# Patient Record
Sex: MALE | Race: BLACK OR AFRICAN AMERICAN | Employment: STUDENT | URBAN - METROPOLITAN AREA
[De-identification: names, ages, dates, MRNs, and addresses within clinical notes are randomized per-mention and may not be internally consistent; named-entity substitution may affect disease eponyms.]

---

## 2019-02-16 ENCOUNTER — APPOINTMENT (OUTPATIENT)
Dept: GENERAL RADIOLOGY | Age: 19
End: 2019-02-16
Payer: MEDICAID

## 2019-02-16 ENCOUNTER — HOSPITAL ENCOUNTER (EMERGENCY)
Age: 19
Discharge: HOME | End: 2019-02-16
Payer: MEDICAID

## 2019-02-16 VITALS
RESPIRATION RATE: 16 BRPM | TEMPERATURE: 98 F | OXYGEN SATURATION: 99 % | DIASTOLIC BLOOD PRESSURE: 85 MMHG | BODY MASS INDEX: 29.68 KG/M2 | HEIGHT: 71 IN | HEART RATE: 87 BPM | WEIGHT: 212 LBS | SYSTOLIC BLOOD PRESSURE: 134 MMHG

## 2019-02-16 DIAGNOSIS — R07.9 CHEST PAIN, UNSPECIFIED TYPE: Primary | ICD-10-CM

## 2019-02-16 DIAGNOSIS — R74.01 TRANSAMINITIS: ICD-10-CM

## 2019-02-16 LAB
A/G RATIO: 1.6 (ref 1.1–2.2)
ALBUMIN SERPL-MCNC: 4.7 G/DL (ref 3.4–5)
ALP BLD-CCNC: 111 U/L (ref 40–129)
ALT SERPL-CCNC: 186 U/L (ref 10–40)
ANION GAP SERPL CALCULATED.3IONS-SCNC: 13 MMOL/L (ref 3–16)
APTT: 34.1 SEC (ref 26–36)
AST SERPL-CCNC: 86 U/L (ref 15–37)
BASOPHILS ABSOLUTE: 0.1 K/UL (ref 0–0.2)
BASOPHILS RELATIVE PERCENT: 1 %
BILIRUB SERPL-MCNC: 0.3 MG/DL (ref 0–1)
BUN BLDV-MCNC: 10 MG/DL (ref 7–20)
CALCIUM SERPL-MCNC: 10.1 MG/DL (ref 8.3–10.6)
CHLORIDE BLD-SCNC: 100 MMOL/L (ref 99–110)
CO2: 24 MMOL/L (ref 21–32)
CREAT SERPL-MCNC: 0.6 MG/DL (ref 0.9–1.3)
D DIMER: <200 NG/ML DDU (ref 0–229)
EOSINOPHILS ABSOLUTE: 0.1 K/UL (ref 0–0.6)
EOSINOPHILS RELATIVE PERCENT: 1.8 %
GFR AFRICAN AMERICAN: >60
GFR NON-AFRICAN AMERICAN: >60
GLOBULIN: 2.9 G/DL
GLUCOSE BLD-MCNC: 100 MG/DL (ref 70–99)
HCT VFR BLD CALC: 43.6 % (ref 40.5–52.5)
HEMOGLOBIN: 14.3 G/DL (ref 13.5–17.5)
INR BLD: 0.97 (ref 0.86–1.14)
LYMPHOCYTES ABSOLUTE: 2.9 K/UL (ref 1–5.1)
LYMPHOCYTES RELATIVE PERCENT: 44.7 %
MCH RBC QN AUTO: 30 PG (ref 26–34)
MCHC RBC AUTO-ENTMCNC: 32.8 G/DL (ref 31–36)
MCV RBC AUTO: 91.4 FL (ref 80–100)
MONOCYTES ABSOLUTE: 0.6 K/UL (ref 0–1.3)
MONOCYTES RELATIVE PERCENT: 10 %
NEUTROPHILS ABSOLUTE: 2.7 K/UL (ref 1.7–7.7)
NEUTROPHILS RELATIVE PERCENT: 42.5 %
PDW BLD-RTO: 13.6 % (ref 12.4–15.4)
PLATELET # BLD: 287 K/UL (ref 135–450)
PMV BLD AUTO: 7.9 FL (ref 5–10.5)
POTASSIUM SERPL-SCNC: 4.2 MMOL/L (ref 3.5–5.1)
PROTHROMBIN TIME: 11.1 SEC (ref 9.8–13)
RBC # BLD: 4.77 M/UL (ref 4.2–5.9)
SODIUM BLD-SCNC: 137 MMOL/L (ref 136–145)
TOTAL PROTEIN: 7.6 G/DL (ref 6.4–8.2)
TROPONIN: <0.01 NG/ML
WBC # BLD: 6.4 K/UL (ref 4–11)

## 2019-02-16 PROCEDURE — 85379 FIBRIN DEGRADATION QUANT: CPT

## 2019-02-16 PROCEDURE — 85730 THROMBOPLASTIN TIME PARTIAL: CPT

## 2019-02-16 PROCEDURE — 85025 COMPLETE CBC W/AUTO DIFF WBC: CPT

## 2019-02-16 PROCEDURE — 85610 PROTHROMBIN TIME: CPT

## 2019-02-16 PROCEDURE — 84484 ASSAY OF TROPONIN QUANT: CPT

## 2019-02-16 PROCEDURE — 71046 X-RAY EXAM CHEST 2 VIEWS: CPT

## 2019-02-16 PROCEDURE — 99285 EMERGENCY DEPT VISIT HI MDM: CPT

## 2019-02-16 PROCEDURE — 93005 ELECTROCARDIOGRAM TRACING: CPT | Performed by: EMERGENCY MEDICINE

## 2019-02-16 PROCEDURE — 80053 COMPREHEN METABOLIC PANEL: CPT

## 2019-02-16 RX ORDER — NAPROXEN 250 MG/1
500 TABLET ORAL ONCE
Status: DISCONTINUED | OUTPATIENT
Start: 2019-02-16 | End: 2019-02-16 | Stop reason: HOSPADM

## 2019-02-16 RX ORDER — NAPROXEN 500 MG/1
500 TABLET ORAL 2 TIMES DAILY PRN
Qty: 20 TABLET | Refills: 0 | Status: SHIPPED | OUTPATIENT
Start: 2019-02-16 | End: 2020-03-02

## 2019-02-16 RX ORDER — KETOROLAC TROMETHAMINE 30 MG/ML
30 INJECTION, SOLUTION INTRAMUSCULAR; INTRAVENOUS ONCE
Status: DISCONTINUED | OUTPATIENT
Start: 2019-02-16 | End: 2019-02-16

## 2019-02-16 ASSESSMENT — ENCOUNTER SYMPTOMS
CHEST TIGHTNESS: 0
VOICE CHANGE: 1
ABDOMINAL PAIN: 0
BACK PAIN: 0
NAUSEA: 1
WHEEZING: 0
DIARRHEA: 0
VOMITING: 0
COUGH: 0
SHORTNESS OF BREATH: 1

## 2019-02-16 ASSESSMENT — HEART SCORE: ECG: 0

## 2019-02-16 ASSESSMENT — PAIN SCALES - GENERAL: PAINLEVEL_OUTOF10: 7

## 2019-02-16 ASSESSMENT — PAIN DESCRIPTION - LOCATION: LOCATION: CHEST

## 2019-02-17 LAB
EKG ATRIAL RATE: 99 BPM
EKG DIAGNOSIS: NORMAL
EKG P AXIS: 70 DEGREES
EKG P-R INTERVAL: 156 MS
EKG Q-T INTERVAL: 326 MS
EKG QRS DURATION: 88 MS
EKG QTC CALCULATION (BAZETT): 418 MS
EKG R AXIS: 47 DEGREES
EKG T AXIS: 29 DEGREES
EKG VENTRICULAR RATE: 99 BPM

## 2019-02-17 PROCEDURE — 93010 ELECTROCARDIOGRAM REPORT: CPT | Performed by: INTERNAL MEDICINE

## 2020-02-20 NOTE — PROGRESS NOTES
[facility]   22 y.o. male   2000    HPI:  Chief Complaint   Patient presents with   Ernesto Birch Doctor     This is patient's first visit with me. He has no chronic health issues. He is majoring in [employer] and attends [employer] and [employer]. He is an internship. He reports of paresthesia of hands and feet for past 2-3 weeks, which is relieved with taking \"vitamins\" - garlic, spiriluna, magnesium. He is not taking any antacids, H2/PPIs, or other meds for reflux. He also reports of dyspepsia and LUQ discomfort. He also reports of skin issues such as dry skin and acne. He had mild \"common cold symptoms\" prior to this. No nausea, emesis, diarrhea, etc.  He denied cervical and lumbar radiculopathy. He denied injury/trauma with neck or low back pain. He denied restless legs. He also reports of \"gaining a lot of weight\" - over past 2 years - 35-40 lbs. He is not physically active. Has been \"feeling more cold lately\". He stated that he had changed his diet about 3 weeks ago cutting out sugar because of the weight gain. Lastly, he reports of darkening of the skin in spots of his upper extremities and forehead and axillary areas. No Known Allergies  Current Outpatient Medications on File Prior to Visit   Medication Sig Dispense Refill    naproxen (NAPROSYN) 500 MG tablet Take 1 tablet by mouth 2 times daily as needed for Pain 20 tablet 0     No current facility-administered medications on file prior to visit. Family History   Problem Relation Age of Onset    High Cholesterol Mother     Cancer Maternal Grandmother      Social History     Tobacco Use    Smoking status: Never Smoker    Smokeless tobacco: Never Used   Substance Use Topics    Alcohol use: Never     Frequency: Never      Review of Systems   Constitutional: Negative for activity change, appetite change, fatigue, fever and unexpected weight change.    HENT: Negative for congestion, rhinorrhea, sinus pressure and rhonchi or rales. Abdominal:      General: Abdomen is flat. There is no distension. There are no signs of injury. Palpations: Abdomen is soft. Tenderness: There is no abdominal tenderness. Hernia: No hernia is present. Musculoskeletal: Normal range of motion. General: No deformity. Right lower leg: No edema. Left lower leg: No edema. Lymphadenopathy:      Cervical: No cervical adenopathy. Skin:     Coloration: Skin is not jaundiced or pale. Findings: No erythema or rash. Neurological:      General: No focal deficit present. Mental Status: He is alert. Mental status is at baseline. Psychiatric:         Mood and Affect: Mood normal.         Behavior: Behavior normal.       Assessment/Plan:     Miller Tafoya was seen today for established new doctor. Diagnoses and all orders for this visit:    Encounter to establish care with new doctor  -     CBC Auto Differential; Future    Paresthesia of both lower extremities  Comments:  Low suspicion for autoimmune conditions or infectious etiologies (eg campylobacter). Orders:  -     Vitamin B12 & Folate; Future  -     T4, Free; Future  -     TSH without Reflex; Future  -     Vitamin D 25 Hydroxy; Future  -     Magnesium; Future  -     PHOSPHORUS; Future  -     Hemoglobin A1C; Future    Paresthesia and pain of both upper extremities  -     Vitamin B12 & Folate; Future  -     T4, Free; Future  -     TSH without Reflex; Future  -     Vitamin D 25 Hydroxy; Future  -     Magnesium; Future  -     PHOSPHORUS; Future  -     Hemoglobin A1C; Future    Left upper quadrant pain  Comments:  No acute abdomen. No organomegaly. Low suspicion of GERD. Could be related to the vitamins he is consuming. Thyromegaly  -     T4, Free; Future  -     TSH without Reflex; Future    Elevated blood-pressure reading, without diagnosis of hypertension  Comments:  Likely due to first visit here. Will re-check at next visit.     Encounter for screening for HIV  -     HIV-1 AND HIV-2 ANTIBODIES; Future       There are no discontinued medications. PDMP Monitoring:  Last PDMP Luna Scale as Reviewed [facility]):  Review User Review Instant Review Result   FAUSTO MOMIN 2/21/2020  3:11 PM Reviewed PDMP [1]       Follow-up: Return in about 4 weeks (around 3/20/2020) for paresthesia issues. . Patient was informed that if his or her symptoms worsen to return here or go to nearest ER if symptoms significantly worsen.      Electronically signed by Abhijit Velarde MD on 2/21/2020 at 4:33 PM.

## 2020-02-21 ENCOUNTER — OFFICE VISIT (OUTPATIENT)
Dept: FAMILY MEDICINE CLINIC | Age: 20
End: 2020-02-21
Payer: MEDICAID

## 2020-02-21 VITALS
TEMPERATURE: 98.2 F | BODY MASS INDEX: 28.14 KG/M2 | OXYGEN SATURATION: 98 % | WEIGHT: 201 LBS | SYSTOLIC BLOOD PRESSURE: 142 MMHG | RESPIRATION RATE: 16 BRPM | DIASTOLIC BLOOD PRESSURE: 80 MMHG | HEART RATE: 82 BPM | HEIGHT: 71 IN

## 2020-02-21 DIAGNOSIS — R20.2 PARESTHESIA AND PAIN OF BOTH UPPER EXTREMITIES: ICD-10-CM

## 2020-02-21 DIAGNOSIS — Z11.4 ENCOUNTER FOR SCREENING FOR HIV: ICD-10-CM

## 2020-02-21 DIAGNOSIS — Z76.89 ENCOUNTER TO ESTABLISH CARE WITH NEW DOCTOR: ICD-10-CM

## 2020-02-21 DIAGNOSIS — R20.2 PARESTHESIA OF BOTH LOWER EXTREMITIES: ICD-10-CM

## 2020-02-21 DIAGNOSIS — M79.601 PARESTHESIA AND PAIN OF BOTH UPPER EXTREMITIES: ICD-10-CM

## 2020-02-21 DIAGNOSIS — M79.602 PARESTHESIA AND PAIN OF BOTH UPPER EXTREMITIES: ICD-10-CM

## 2020-02-21 LAB
BASOPHILS ABSOLUTE: 0 K/UL (ref 0–0.2)
BASOPHILS RELATIVE PERCENT: 0.7 %
EOSINOPHILS ABSOLUTE: 0 K/UL (ref 0–0.6)
EOSINOPHILS RELATIVE PERCENT: 0.5 %
FOLATE: 14.51 NG/ML (ref 4.78–24.2)
HCT VFR BLD CALC: 43 % (ref 40.5–52.5)
HEMOGLOBIN: 14.1 G/DL (ref 13.5–17.5)
LYMPHOCYTES ABSOLUTE: 2.1 K/UL (ref 1–5.1)
LYMPHOCYTES RELATIVE PERCENT: 32.9 %
MAGNESIUM: 2 MG/DL (ref 1.8–2.4)
MCH RBC QN AUTO: 29.8 PG (ref 26–34)
MCHC RBC AUTO-ENTMCNC: 32.8 G/DL (ref 31–36)
MCV RBC AUTO: 90.8 FL (ref 80–100)
MONOCYTES ABSOLUTE: 0.4 K/UL (ref 0–1.3)
MONOCYTES RELATIVE PERCENT: 6 %
NEUTROPHILS ABSOLUTE: 3.9 K/UL (ref 1.7–7.7)
NEUTROPHILS RELATIVE PERCENT: 59.9 %
PDW BLD-RTO: 13.3 % (ref 12.4–15.4)
PHOSPHORUS: 3.5 MG/DL (ref 2.5–4.9)
PLATELET # BLD: 300 K/UL (ref 135–450)
PMV BLD AUTO: 8.7 FL (ref 5–10.5)
RBC # BLD: 4.74 M/UL (ref 4.2–5.9)
T4 FREE: 1.2 NG/DL (ref 0.9–1.8)
TSH SERPL DL<=0.05 MIU/L-ACNC: 1.43 UIU/ML (ref 0.27–4.2)
VITAMIN B-12: 470 PG/ML (ref 211–911)
VITAMIN D 25-HYDROXY: 21.8 NG/ML
WBC # BLD: 6.4 K/UL (ref 4–11)

## 2020-02-21 PROCEDURE — 99204 OFFICE O/P NEW MOD 45 MIN: CPT | Performed by: FAMILY MEDICINE

## 2020-02-21 SDOH — HEALTH STABILITY: MENTAL HEALTH: HOW OFTEN DO YOU HAVE A DRINK CONTAINING ALCOHOL?: NEVER

## 2020-02-21 ASSESSMENT — ENCOUNTER SYMPTOMS
BLOOD IN STOOL: 0
BACK PAIN: 0
SHORTNESS OF BREATH: 0
CONSTIPATION: 0
WHEEZING: 0
ABDOMINAL DISTENTION: 0
NAUSEA: 0
CHEST TIGHTNESS: 0
SINUS PRESSURE: 0
VOMITING: 0
COUGH: 0
RHINORRHEA: 0
DIARRHEA: 0
ABDOMINAL PAIN: 0
TROUBLE SWALLOWING: 0

## 2020-02-22 PROBLEM — E55.9 VITAMIN D DEFICIENCY: Status: ACTIVE | Noted: 2020-02-22

## 2020-02-22 LAB
ESTIMATED AVERAGE GLUCOSE: 91.1 MG/DL
HBA1C MFR BLD: 4.8 %
HIV AG/AB: NORMAL
HIV ANTIGEN: NORMAL
HIV-1 ANTIBODY: NORMAL
HIV-2 AB: NORMAL

## 2020-02-25 ENCOUNTER — TELEPHONE (OUTPATIENT)
Dept: FAMILY MEDICINE CLINIC | Age: 20
End: 2020-02-25

## 2020-02-28 ENCOUNTER — TELEPHONE (OUTPATIENT)
Dept: FAMILY MEDICINE CLINIC | Age: 20
End: 2020-02-28

## 2020-02-28 ENCOUNTER — APPOINTMENT (OUTPATIENT)
Dept: GENERAL RADIOLOGY | Age: 20
End: 2020-02-28
Payer: MEDICAID

## 2020-02-28 ENCOUNTER — HOSPITAL ENCOUNTER (EMERGENCY)
Age: 20
Discharge: HOME | End: 2020-02-29
Attending: EMERGENCY MEDICINE
Payer: MEDICAID

## 2020-02-28 PROBLEM — R74.8 ELEVATED LIVER ENZYMES: Status: ACTIVE | Noted: 2020-02-28

## 2020-02-28 LAB
ANION GAP SERPL CALCULATED.3IONS-SCNC: 14 MMOL/L (ref 3–16)
BASOPHILS ABSOLUTE: 0 K/UL (ref 0–0.2)
BASOPHILS RELATIVE PERCENT: 0.4 %
BUN BLDV-MCNC: 10 MG/DL (ref 7–20)
CALCIUM SERPL-MCNC: 10.1 MG/DL (ref 8.3–10.6)
CHLORIDE BLD-SCNC: 101 MMOL/L (ref 99–110)
CO2: 25 MMOL/L (ref 21–32)
CREAT SERPL-MCNC: 0.8 MG/DL (ref 0.9–1.3)
EOSINOPHILS ABSOLUTE: 0.1 K/UL (ref 0–0.6)
EOSINOPHILS RELATIVE PERCENT: 0.8 %
GFR AFRICAN AMERICAN: >60
GFR NON-AFRICAN AMERICAN: >60
GLUCOSE BLD-MCNC: 99 MG/DL (ref 70–99)
HCT VFR BLD CALC: 44.8 % (ref 40.5–52.5)
HEMOGLOBIN: 15 G/DL (ref 13.5–17.5)
LYMPHOCYTES ABSOLUTE: 2.3 K/UL (ref 1–5.1)
LYMPHOCYTES RELATIVE PERCENT: 33 %
MCH RBC QN AUTO: 30.3 PG (ref 26–34)
MCHC RBC AUTO-ENTMCNC: 33.5 G/DL (ref 31–36)
MCV RBC AUTO: 90.2 FL (ref 80–100)
MONOCYTES ABSOLUTE: 0.4 K/UL (ref 0–1.3)
MONOCYTES RELATIVE PERCENT: 6 %
NEUTROPHILS ABSOLUTE: 4.2 K/UL (ref 1.7–7.7)
NEUTROPHILS RELATIVE PERCENT: 59.8 %
PDW BLD-RTO: 13.2 % (ref 12.4–15.4)
PLATELET # BLD: 322 K/UL (ref 135–450)
PMV BLD AUTO: 8.6 FL (ref 5–10.5)
POTASSIUM SERPL-SCNC: 4.2 MMOL/L (ref 3.5–5.1)
RBC # BLD: 4.96 M/UL (ref 4.2–5.9)
SODIUM BLD-SCNC: 140 MMOL/L (ref 136–145)
TROPONIN: <0.01 NG/ML
WBC # BLD: 7.1 K/UL (ref 4–11)

## 2020-02-28 PROCEDURE — 83690 ASSAY OF LIPASE: CPT

## 2020-02-28 PROCEDURE — 80076 HEPATIC FUNCTION PANEL: CPT

## 2020-02-28 PROCEDURE — 85025 COMPLETE CBC W/AUTO DIFF WBC: CPT

## 2020-02-28 PROCEDURE — 80048 BASIC METABOLIC PNL TOTAL CA: CPT

## 2020-02-28 PROCEDURE — 84484 ASSAY OF TROPONIN QUANT: CPT

## 2020-02-28 PROCEDURE — 71046 X-RAY EXAM CHEST 2 VIEWS: CPT

## 2020-02-28 PROCEDURE — 99285 EMERGENCY DEPT VISIT HI MDM: CPT

## 2020-02-28 ASSESSMENT — PAIN SCALES - GENERAL: PAINLEVEL_OUTOF10: 8

## 2020-02-29 ENCOUNTER — APPOINTMENT (OUTPATIENT)
Dept: CT IMAGING | Age: 20
End: 2020-02-29
Payer: MEDICAID

## 2020-02-29 VITALS
OXYGEN SATURATION: 100 % | HEART RATE: 102 BPM | BODY MASS INDEX: 31.5 KG/M2 | DIASTOLIC BLOOD PRESSURE: 86 MMHG | RESPIRATION RATE: 20 BRPM | WEIGHT: 225 LBS | SYSTOLIC BLOOD PRESSURE: 162 MMHG | HEIGHT: 71 IN | TEMPERATURE: 98.1 F

## 2020-02-29 PROBLEM — K80.20 CHOLELITHIASIS WITHOUT CHOLANGITIS: Status: ACTIVE | Noted: 2020-02-29

## 2020-02-29 LAB
ALBUMIN SERPL-MCNC: 5.4 G/DL (ref 3.4–5)
ALP BLD-CCNC: 130 U/L (ref 40–129)
ALT SERPL-CCNC: 20 U/L (ref 10–40)
AST SERPL-CCNC: 24 U/L (ref 15–37)
BILIRUB SERPL-MCNC: 0.4 MG/DL (ref 0–1)
BILIRUBIN DIRECT: <0.2 MG/DL (ref 0–0.3)
BILIRUBIN, INDIRECT: ABNORMAL MG/DL (ref 0–1)
LIPASE: 28 U/L (ref 13–60)
TOTAL PROTEIN: 9 G/DL (ref 6.4–8.2)

## 2020-02-29 PROCEDURE — 6360000004 HC RX CONTRAST MEDICATION: Performed by: PHYSICIAN ASSISTANT

## 2020-02-29 PROCEDURE — 74177 CT ABD & PELVIS W/CONTRAST: CPT

## 2020-02-29 RX ORDER — IBUPROFEN 400 MG/1
400 TABLET ORAL EVERY 6 HOURS PRN
Qty: 20 TABLET | Refills: 0 | Status: SHIPPED | OUTPATIENT
Start: 2020-02-29

## 2020-02-29 RX ADMIN — IOPAMIDOL 75 ML: 755 INJECTION, SOLUTION INTRAVENOUS at 01:41

## 2020-02-29 ASSESSMENT — ENCOUNTER SYMPTOMS
NAUSEA: 0
SHORTNESS OF BREATH: 0
ABDOMINAL PAIN: 0
VOMITING: 0

## 2020-02-29 NOTE — ED PROVIDER NOTES
I independently performed a history and physical on [facility]. All diagnostic, treatment, and disposition decisions were made by myself in conjunction with the advanced practice provider. Briefly, this is a 22 y.o. male here for upper abdominal discomfort pushing up into his chest.  Also noticed changes in the color of his stools. .    On exam, nontoxic-appearing adult male in no acute distress, patient seems very anxious, his heart rate shoots up to the 150s when I enter the room but then he did slowly return to normal.  Heart and lung sounds are normal.  Normal abdominal sounds. Abdomen is soft nondistended and nontender. EKG  The Ekg interpreted by me in the absence of a cardiologist shows:  Normal sinus rhythm, AR interval QRS QTC normal.  Normal axis. No acute ischemic changes  No significant change from prior EKG dated         Screenings     Heart Score for chest pain patients  History: Slightly Suspicious  Patient Age: < 45 years  *Risk factors for Atherosclerotic disease: Hypertension      Critical Time  None       MDM  Adult male with abdominal pain. CT scan shows cholelithiasis. No signs of cholecystitis. Operatory studies show no signs of biliary obstruction. The patient will be discharged to follow-up with general surgery. Return instructions discussed. Patient Referrals:  [facility] Emergency Department  [address]  [phone]    If symptoms worsen      Discharge Medications:  New Prescriptions    IBUPROFEN (ADVIL;MOTRIN) 400 MG TABLET    Take 1 tablet by mouth every 6 hours as needed for Pain       FINAL IMPRESSION  1. Calculus of gallbladder without cholecystitis without obstruction        Blood pressure 130/78, pulse 74, temperature 98.1 °F (36.7 °C), resp. rate 20, height 5' 11\" (1.803 m), weight 225 lb (102.1 kg), SpO2 97 %.      For further details of [facility] emergency department encounter, please see documentation by advanced practice provider.       Elma Noyola MD  02/29/20 9833

## 2020-02-29 NOTE — ED PROVIDER NOTES
components within normal limits    Narrative:     Performed at:  [facility]  [address], [address]   Phone [phone]   HEPATIC FUNCTION PANEL - Abnormal; Notable for the following components: Total Protein 9.0 (*)     Alb 5.4 (*)     Alkaline Phosphatase 130 (*)     All other components within normal limits    Narrative:     Performed at:  [facility] [address]  [place], [address]   Phone [phone]   TROPONIN    Narrative:     Performed at:  [facility] [address],  [place], [address]   Phone [phone]   CBC WITH AUTO DIFFERENTIAL    Narrative:     Performed at:  [facility] [address],  [place], [address]   Phone [phone]   LIPASE    Narrative:     Performed at:  [facility] [address]  [place], [address]   Phone [phone]       All other labs were within normal range or not returned as of this dictation. EKG: All EKG's are interpreted by the Emergency Department Physician in the absence of a cardiologist.  Please see their note for interpretation of EKG. RADIOLOGY:   Non-plain film images such as CT, Ultrasound and MRI are read by the radiologist. Plain radiographic images are visualized and preliminarily interpreted by the ED Provider with the below findings:        Interpretation per the Radiologist below, if available at the time of this note:    CT ABDOMEN PELVIS W IV CONTRAST Additional Contrast? None   Preliminary Result   No acute abdominopelvic findings. Cholelithiasis without evidence of cholecystitis or bile duct dilatation. Normal appendix. Scattered prominent mesenteric lymph nodes are nonspecific, presumably   reactive. Any comparison imaging would be helpful if available. Otherwise,   consider CT abdomen pelvis in 3-6 months.          XR CHEST STANDARD (2 VW)   Final Result   No evidence for acute cardiopulmonary pathology. Xr Chest Standard (2 Vw)    Result Date: 2/28/2020  EXAMINATION: TWO XRAY VIEWS OF THE CHEST 2/28/2020 8:55 pm COMPARISON: 02/16/2019 HISTORY: ORDERING SYSTEM PROVIDED HISTORY: cp TECHNOLOGIST PROVIDED HISTORY: Reason for exam:->cp Reason for Exam: Chest Pain (left sided chest pain \" pushing\" along left side of chest and upper ABD. Pt reports pain began around 11am with no relief. Pt is anxious at time of triage. he adds that he has been having bernie colored stools for last week. ) Acuity: Unknown Type of Exam: Unknown FINDINGS: Frontal and lateral views of the chest are submitted for review. The cardiac silhouette is normal in size. Lung parenchyma is clear without focal airspace consolidation, sizeable pleural effusion, or pneumothorax. Trachea is midline. Osseous structures and soft tissues are grossly intact. No evidence for acute cardiopulmonary pathology. PROCEDURES   Unless otherwise noted below, none     Procedures    CRITICAL CARE TIME   N/A    CONSULTS:  None      EMERGENCY DEPARTMENT COURSE and DIFFERENTIAL DIAGNOSIS/MDM:   Vitals:    Vitals:    02/29/20 0030 02/29/20 0100 02/29/20 0130 02/29/20 0200   BP: 132/75 130/78 124/73 (!) 162/86   Pulse: 81 74 83 102   Resp:       Temp:       SpO2: 98% 97% 97% 100%   Weight:       Height:           Patient was given the following medications:  Medications   iopamidol (ISOVUE-370) 76 % injection 75 mL (75 mLs Intravenous Given 2/29/20 0141)       Patient presents emergency department for evaluation of abdominal pain that radiates into his chest.  Patient is alert and oriented no acute distress. Vitals are stable and he is afebrile. Patient is low risk on heart score. EKG shows no ST changes. Troponin is less than detectable. Laboratory evaluation largely unremarkable. Abdomen is nontender without rebound or guarding.   CT of his abdomen shows cholelithiasis without evidence of cholecystitis. Patient will be referred to general surgery. Given ibuprofen for pain. Patient is amenable to this outpatient plan will be discharged to follow-up with surgery. I see nothing that would suggest an acute abdomen at this time. Based on history, physical exam, risk factors, and tests my suspicion for bowel obstruction, incarcerated hernia, acute pancreatitis, intra-abdominal abscess, perforated viscus, diverticulitis, cholecystitis, appendicitis, testicular torsion and cardiac ischemia is very low. There is no evidence of peritonitis, sepsis or toxicity at this time. I feel the patient can be managed as an outpatient with follow-up with his family doctor in 24-48 hours. Instructions have been given for the patient to return to the emergency department for worsening of the pain, high fevers, intractable vomiting, bleeding or any other concerns      FINAL IMPRESSION      1.  Calculus of gallbladder without cholecystitis without obstruction          DISPOSITION/PLAN   DISPOSITION Decision To Discharge 02/29/2020 02:10:59 AM      PATIENT REFERREDTO:  [facility] Emergency Department  [address] [place] [address]  [phone]    If symptoms worsen      DISCHARGE MEDICATIONS:  New Prescriptions    IBUPROFEN (ADVIL;MOTRIN) 400 MG TABLET    Take 1 tablet by mouth every 6 hours as needed for Pain       DISCONTINUED MEDICATIONS:  Discontinued Medications    No medications on file              (Please note that portions of this note were completed with a voice recognition program.  Efforts were made to edit the dictations but occasionally words are mis-transcribed.)    Arnel Forrester PA-C (electronically signed)            Arnel Forrester PA-C  02/29/20 5013

## 2020-03-02 ENCOUNTER — OFFICE VISIT (OUTPATIENT)
Dept: SURGERY | Age: 20
End: 2020-03-02
Payer: MEDICAID

## 2020-03-02 VITALS
WEIGHT: 196 LBS | SYSTOLIC BLOOD PRESSURE: 140 MMHG | HEIGHT: 72 IN | DIASTOLIC BLOOD PRESSURE: 100 MMHG | BODY MASS INDEX: 26.55 KG/M2

## 2020-03-02 PROBLEM — R74.8 ELEVATED LIVER ENZYMES: Status: RESOLVED | Noted: 2020-02-28 | Resolved: 2020-03-02

## 2020-03-02 PROCEDURE — 99203 OFFICE O/P NEW LOW 30 MIN: CPT | Performed by: SURGERY

## 2020-03-02 PROCEDURE — 1036F TOBACCO NON-USER: CPT | Performed by: SURGERY

## 2020-03-02 PROCEDURE — G8419 CALC BMI OUT NRM PARAM NOF/U: HCPCS | Performed by: SURGERY

## 2020-03-02 PROCEDURE — G8484 FLU IMMUNIZE NO ADMIN: HCPCS | Performed by: SURGERY

## 2020-03-02 PROCEDURE — G8427 DOCREV CUR MEDS BY ELIG CLIN: HCPCS | Performed by: SURGERY

## 2020-03-02 RX ORDER — SUCRALFATE 1 G/1
1 TABLET ORAL 4 TIMES DAILY
Qty: 120 TABLET | Refills: 3 | Status: SHIPPED | OUTPATIENT
Start: 2020-03-02

## 2020-03-02 RX ORDER — OMEPRAZOLE 20 MG/1
20 CAPSULE, DELAYED RELEASE ORAL 2 TIMES DAILY
Qty: 30 CAPSULE | Refills: 3 | Status: SHIPPED | OUTPATIENT
Start: 2020-03-02

## 2020-03-02 ASSESSMENT — ENCOUNTER SYMPTOMS
EYE DISCHARGE: 0
COLOR CHANGE: 0
EYE ITCHING: 0
BACK PAIN: 0
SHORTNESS OF BREATH: 1
CONSTIPATION: 1
ABDOMINAL DISTENTION: 1
APNEA: 0
ABDOMINAL PAIN: 1

## 2020-03-02 NOTE — PROGRESS NOTES
mouth      GARLIC PO Take by mouth      ibuprofen (ADVIL;MOTRIN) 400 MG tablet Take 1 tablet by mouth every 6 hours as needed for Pain 20 tablet 0     No current facility-administered medications for this visit. No Known Allergies     OBJECTIVE:  BP (!) 140/100   Ht 6' (1.829 m)   Wt 196 lb (88.9 kg)   BMI 26.58 kg/m²    Physical Exam  Constitutional:       General: He is not in acute distress. Appearance: He is well-developed. He is not diaphoretic. HENT:      Head: Normocephalic and atraumatic. Eyes:      Conjunctiva/sclera: Conjunctivae normal.      Pupils: Pupils are equal, round, and reactive to light. Neck:      Musculoskeletal: Normal range of motion and neck supple. Vascular: No JVD. Cardiovascular:      Rate and Rhythm: Normal rate and regular rhythm. Heart sounds: Normal heart sounds. Pulmonary:      Effort: Pulmonary effort is normal. No respiratory distress. Breath sounds: Normal breath sounds. No wheezing. Abdominal:      General: Bowel sounds are normal. There is no distension. Palpations: Abdomen is soft. There is no mass. Tenderness: There is no abdominal tenderness. There is no guarding. Lymphadenopathy:      Cervical: No cervical adenopathy. Skin:     General: Skin is warm and dry. Findings: No erythema or rash. Neurological:      Mental Status: He is alert and oriented to person, place, and time. Psychiatric:         Behavior: Behavior normal.         Thought Content:  Thought content normal.         Judgment: Judgment normal.          Labs:  Admission on 02/28/2020, Discharged on 02/29/2020   Component Date Value Ref Range Status    Sodium 02/28/2020 140  136 - 145 mmol/L Final    Potassium 02/28/2020 4.2  3.5 - 5.1 mmol/L Final    Chloride 02/28/2020 101  99 - 110 mmol/L Final    CO2 02/28/2020 25  21 - 32 mmol/L Final    Anion Gap 02/28/2020 14  3 - 16 Final    Glucose 02/28/2020 99  70 - 99 mg/dL Final    BUN 02/28/2020 10 hemolysis has exceeded the interference as defined by Roche. Value may be falsely increased. Suggest recollection if clinically  indicated.  Total Bilirubin 02/28/2020 0.4  0.0 - 1.0 mg/dL Final    Bilirubin, Direct 02/28/2020 <0.2  0.0 - 0.3 mg/dL Final    Comment: Specimen hemolysis has exceeded the interference as defined by Roche. Value may be falsely increased. Suggest recollection if clinically  indicated.  Bilirubin, Indirect 02/28/2020 see below  0.0 - 1.0 mg/dL Final    Comment: Indirect Bilirubin cannot be calculated since Total Bilirubin  and/or Direct Bilirubin is below measurable range.      Orders Only on 02/21/2020   Component Date Value Ref Range Status    HIV Ag/Ab 02/21/2020 Non-Reactive  Non-reactive Final    HIV-1 Antibody 02/21/2020 Non-Reactive  Non-reactive Final    HIV ANTIGEN 02/21/2020 Non-Reactive  Non-reactive Final    HIV-2 Ab 02/21/2020 Non-Reactive  Non-reactive Final    Hemoglobin A1C 02/21/2020 4.8  See comment % Final    Comment: Comment:  Diagnosis of Diabetes: > or = 6.5%  Increased risk of diabetes (Prediabetes): 5.7-6.4%  Glycemic Control: Nonpregnant Adults: <7.0%                    Pregnant: <6.0%        eAG 02/21/2020 91.1  mg/dL Final    WBC 02/21/2020 6.4  4.0 - 11.0 K/uL Final    RBC 02/21/2020 4.74  4.20 - 5.90 M/uL Final    Hemoglobin 02/21/2020 14.1  13.5 - 17.5 g/dL Final    Hematocrit 02/21/2020 43.0  40.5 - 52.5 % Final    MCV 02/21/2020 90.8  80.0 - 100.0 fL Final    MCH 02/21/2020 29.8  26.0 - 34.0 pg Final    MCHC 02/21/2020 32.8  31.0 - 36.0 g/dL Final    RDW 02/21/2020 13.3  12.4 - 15.4 % Final    Platelets 68/63/3114 300  135 - 450 K/uL Final    MPV 02/21/2020 8.7  5.0 - 10.5 fL Final    Neutrophils % 02/21/2020 59.9  % Final    Lymphocytes % 02/21/2020 32.9  % Final    Monocytes % 02/21/2020 6.0  % Final    Eosinophils % 02/21/2020 0.5  % Final    Basophils % 02/21/2020 0.7  % Final    Neutrophils Absolute 02/21/2020 3.9  1.7 was performed with the administration of intravenous contrast. Multiplanar reformatted images are provided for review. Dose modulation, iterative reconstruction, and/or weight based adjustment of the mA/kV was utilized to reduce the radiation dose to as low as reasonably achievable. COMPARISON: None. HISTORY: ORDERING SYSTEM PROVIDED HISTORY: luq pain, full feeling, pale stool TECHNOLOGIST PROVIDED HISTORY: Reason for exam:->luq pain, full feelling, pale stool Additional Contrast?->None Reason for Exam: Chest Pain (left sided chest pain \" pushing\" along left side of chest and upper ABD. Pt reports pain began around 11am with no relief. Pt is anxious at time of triage. he adds that he has been having bernie colored stools for last week. ) Acuity: Acute Type of Exam: Initial FINDINGS: LOWER CHEST Lung bases: Clear lung bases. Normal included heart and pericardium. ABDOMEN Liver: Normal liver size, contour and parenchymal attenuation. Gallbladder: Cholelithiasis. Biliary: No intra or extrahepatic bile duct dilatation. Pancreas: Normal. Pancreatic tail is slightly folded upon itself. Spleen: Normal. Adrenals: Normal. Kidneys: Kidneys are symmetric in size and parenchymal enhancement. No hydronephrosis or nephrolithiasis. GI Tract: Normal distal esophagus, stomach and duodenal sweep. No apparent bowel wall thickening or obstruction. Normal appendix. Peritoneum/Retroperitoneum: Scattered prominent mesenteric lymph nodes. No free fluid or free air. Vascular: Normal caliber abdominal aorta and IVC. Patent portal, superior mesenteric and splenic veins. PELVIS Genitourinary: Normal urinary bladder. Normal reproductive organs. Other: No free fluid. No enlarged lymph nodes. MUSCULOSKELETAL Bones and Soft Tissues: Normal bones and soft tissues. No acute abdominopelvic findings. Cholelithiasis without evidence of cholecystitis or bile duct dilatation. Normal appendix.  Scattered prominent mesenteric lymph nodes are nonspecific,

## 2020-03-02 NOTE — PATIENT INSTRUCTIONS
1. Abdominal pain of unclear etiology. He does not describe signs or symptoms of classic biliary colic. Burning pain in the left abdomen is possibly consistent with peptic ulcer disease. Will start on Prilosec and Carafate for presumed ulcer  2. Do not recommend cholecystectomy at this point unless he worsens or symptoms become more classic and consistent with epigastric or right upper quadrant pain  3.   Return office in 2 weeks to discuss progress and further options

## 2020-03-12 ENCOUNTER — NURSE TRIAGE (OUTPATIENT)
Dept: OTHER | Facility: CLINIC | Age: 20
End: 2020-03-12

## 2020-03-13 NOTE — PROGRESS NOTES
disease, he denied being bitten by a tick, going to endemic areas for Lyme's disease, and having had the classic \"Bull's-eye rash\". No Known Allergies  Current Outpatient Medications on File Prior to Visit   Medication Sig Dispense Refill    Cyanocobalamin (VITAMIN B12 PO) Take by mouth      Multiple Vitamins-Minerals (VITAMIN D3 COMPLETE PO) Take by mouth      GARLIC PO Take by mouth      omeprazole (PRILOSEC) 20 MG delayed release capsule Take 1 capsule by mouth 2 times daily 30 capsule 3    sucralfate (CARAFATE) 1 GM tablet Take 1 tablet by mouth 4 times daily 120 tablet 3    ibuprofen (ADVIL;MOTRIN) 400 MG tablet Take 1 tablet by mouth every 6 hours as needed for Pain 20 tablet 0     No current facility-administered medications on file prior to visit. Family History   Problem Relation Age of Onset    High Cholesterol Mother     High Blood Pressure Mother     Cancer Maternal Grandmother      Social History     Tobacco Use    Smoking status: Never Smoker    Smokeless tobacco: Never Used   Substance Use Topics    Alcohol use: Never     Frequency: Never      Review of Systems   Constitutional: Negative for activity change, appetite change, fatigue, fever and unexpected weight change. HENT: Negative for congestion, rhinorrhea, sinus pressure and trouble swallowing. Respiratory: Negative for cough, chest tightness, shortness of breath and wheezing. Cardiovascular: Negative for chest pain, palpitations and leg swelling. Gastrointestinal: Negative for abdominal distention, abdominal pain, blood in stool, constipation, diarrhea, nausea and vomiting. Genitourinary: Negative for dysuria, frequency and hematuria. Musculoskeletal: Negative for arthralgias and back pain. Skin: Negative for rash. Neurological: Positive for weakness (hand  (intermittent)). Negative for dizziness, tremors, speech difficulty, light-headedness, numbness and headaches.      Wt Readings from Last 3

## 2020-03-16 ENCOUNTER — OFFICE VISIT (OUTPATIENT)
Dept: FAMILY MEDICINE CLINIC | Age: 20
End: 2020-03-16
Payer: MEDICAID

## 2020-03-16 VITALS
OXYGEN SATURATION: 98 % | HEART RATE: 81 BPM | BODY MASS INDEX: 27.29 KG/M2 | SYSTOLIC BLOOD PRESSURE: 128 MMHG | DIASTOLIC BLOOD PRESSURE: 82 MMHG | WEIGHT: 201.2 LBS

## 2020-03-16 DIAGNOSIS — Z11.59 NEED FOR HEPATITIS C SCREENING TEST: ICD-10-CM

## 2020-03-16 DIAGNOSIS — R20.2 PARESTHESIA OF UPPER AND LOWER EXTREMITIES OF BOTH SIDES: ICD-10-CM

## 2020-03-16 LAB
C-REACTIVE PROTEIN: 0.7 MG/L (ref 0–5.1)
HAV IGM SER IA-ACNC: NORMAL
HEPATITIS B CORE IGM ANTIBODY: NORMAL
HEPATITIS B SURFACE ANTIGEN INTERPRETATION: NORMAL
HEPATITIS C ANTIBODY INTERPRETATION: NORMAL
SEDIMENTATION RATE, ERYTHROCYTE: 3 MM/HR (ref 0–15)

## 2020-03-16 PROCEDURE — 99214 OFFICE O/P EST MOD 30 MIN: CPT | Performed by: FAMILY MEDICINE

## 2020-03-16 ASSESSMENT — ENCOUNTER SYMPTOMS
RHINORRHEA: 0
CHEST TIGHTNESS: 0
BACK PAIN: 0
BLOOD IN STOOL: 0
CONSTIPATION: 0
SINUS PRESSURE: 0
WHEEZING: 0
VOMITING: 0
COUGH: 0
NAUSEA: 0
DIARRHEA: 0
TROUBLE SWALLOWING: 0
ABDOMINAL DISTENTION: 0
ABDOMINAL PAIN: 0
SHORTNESS OF BREATH: 0

## 2020-03-16 ASSESSMENT — PATIENT HEALTH QUESTIONNAIRE - PHQ9
2. FEELING DOWN, DEPRESSED OR HOPELESS: 0
SUM OF ALL RESPONSES TO PHQ QUESTIONS 1-9: 0
1. LITTLE INTEREST OR PLEASURE IN DOING THINGS: 0
SUM OF ALL RESPONSES TO PHQ9 QUESTIONS 1 & 2: 0
SUM OF ALL RESPONSES TO PHQ QUESTIONS 1-9: 0

## 2020-03-17 LAB — ANTI-NUCLEAR ANTIBODY (ANA): NEGATIVE

## 2020-03-18 LAB — H PYLORI BREATH TEST: NEGATIVE

## 2020-03-27 ENCOUNTER — APPOINTMENT (OUTPATIENT)
Dept: GENERAL RADIOLOGY | Age: 20
End: 2020-03-27
Payer: MEDICAID

## 2020-03-27 ENCOUNTER — NURSE TRIAGE (OUTPATIENT)
Dept: OTHER | Facility: CLINIC | Age: 20
End: 2020-03-27

## 2020-03-27 ENCOUNTER — HOSPITAL ENCOUNTER (EMERGENCY)
Age: 20
Discharge: HOME | End: 2020-03-27
Payer: MEDICAID

## 2020-03-27 ENCOUNTER — TELEPHONE (OUTPATIENT)
Dept: FAMILY MEDICINE CLINIC | Age: 20
End: 2020-03-27

## 2020-03-27 VITALS
OXYGEN SATURATION: 99 % | SYSTOLIC BLOOD PRESSURE: 155 MMHG | RESPIRATION RATE: 16 BRPM | HEART RATE: 83 BPM | DIASTOLIC BLOOD PRESSURE: 99 MMHG | HEIGHT: 72 IN | TEMPERATURE: 98.6 F | BODY MASS INDEX: 27.09 KG/M2 | WEIGHT: 200 LBS

## 2020-03-27 PROCEDURE — 99283 EMERGENCY DEPT VISIT LOW MDM: CPT

## 2020-03-27 PROCEDURE — 71046 X-RAY EXAM CHEST 2 VIEWS: CPT

## 2020-03-27 PROCEDURE — 93005 ELECTROCARDIOGRAM TRACING: CPT | Performed by: EMERGENCY MEDICINE

## 2020-03-27 RX ORDER — ALBUTEROL SULFATE 90 UG/1
AEROSOL, METERED RESPIRATORY (INHALATION)
Qty: 1 INHALER | Refills: 0 | Status: SHIPPED | OUTPATIENT
Start: 2020-03-27 | End: 2020-03-29 | Stop reason: SDUPTHER

## 2020-03-27 NOTE — ED PROVIDER NOTES
EKG:  Read by me in the absence of a cardiologist shows:  Sinus rhythm, normal rate, normal conduction intervals, normal axis, no acute injury pattern, no prior EKG available for comparison     We discussed this patient's clinical presentation but I did not perform face-to-face evaluation. Please see MARICRUZ Moscoso note for further information on this visit.          Bela Escobar MD  03/27/20 1536

## 2020-03-27 NOTE — TELEPHONE ENCOUNTER
Pt called wanted to speak to Dr. Layla Ramirez due to symptom of shortness of breath I transferred him to nurse triage.

## 2020-03-27 NOTE — ED PROVIDER NOTES
mouth    OMEPRAZOLE (PRILOSEC) 20 MG DELAYED RELEASE CAPSULE    Take 1 capsule by mouth 2 times daily    SUCRALFATE (CARAFATE) 1 GM TABLET    Take 1 tablet by mouth 4 times daily         Review of Systems:  Review of Systems  Positives and Pertinent negatives as per HPI. Except as noted above in the ROS, problem specific ROS was completed and is negative. Physical Exam:  Physical Exam  Vitals signs and nursing note reviewed. Constitutional:       Appearance: He is well-developed. He is not diaphoretic. HENT:      Head: Atraumatic. Nose: Nose normal.   Eyes:      General:         Right eye: No discharge. Left eye: No discharge. Neck:      Musculoskeletal: Normal range of motion. Cardiovascular:      Rate and Rhythm: Normal rate and regular rhythm. Heart sounds: No murmur. No friction rub. No gallop. Pulmonary:      Effort: Pulmonary effort is normal. No respiratory distress. Breath sounds: No stridor. No wheezing, rhonchi or rales. Abdominal:      General: Bowel sounds are normal. There is no distension. Palpations: Abdomen is soft. There is no mass. Tenderness: There is no abdominal tenderness. There is no guarding or rebound. Hernia: No hernia is present. Musculoskeletal: Normal range of motion. General: No swelling. Skin:     General: Skin is warm and dry. Findings: No erythema or rash. Neurological:      Mental Status: He is alert and oriented to person, place, and time. Cranial Nerves: No cranial nerve deficit. Psychiatric:         Behavior: Behavior normal.         MEDICAL DECISION MAKING    Vitals:    Vitals:    03/27/20 1530   BP: (!) 155/99   Pulse: 83   Resp: 16   Temp: 98.6 °F (37 °C)   TempSrc: Oral   SpO2: 99%   Weight: 200 lb (90.7 kg)   Height: 6' (1.829 m)       LABS:Labs Reviewed - No data to display     Remainder of labs reviewed and werenegative at this time or not returned at the time of this note.     RADIOLOGY: Non-plain film images such as CT, Ultrasound and MRI are read by the radiologist. Yordy Salas PA-C have directly visualized the radiologic plain film image(s) with the below findings:        Interpretation per the Radiologist below, if available at the time of this note:    XR CHEST STANDARD (2 VW)   Final Result   No radiographic evidence of acute cardiopulmonary disease. Xr Chest Standard (2 Vw)    Result Date: 3/27/2020  EXAMINATION: TWO X-RAY VIEWS OF THE CHEST 3/27/2020 3:55 pm COMPARISON: Chest 02/28/2020 HISTORY: Cough FINDINGS: The cardiomediastinal and hilar silhouettes appear unremarkable. The lungs appear clear. No pleural effusion evident. No pneumothorax is seen. No acute osseous abnormality is identified. No radiographic evidence of acute cardiopulmonary disease. Xr Chest Standard (2 Vw)    Result Date: 2/28/2020  EXAMINATION: TWO XRAY VIEWS OF THE CHEST 2/28/2020 8:55 pm COMPARISON: 02/16/2019 HISTORY: ORDERING SYSTEM PROVIDED HISTORY: cp TECHNOLOGIST PROVIDED HISTORY: Reason for exam:->cp Reason for Exam: Chest Pain (left sided chest pain \" pushing\" along left side of chest and upper ABD. Pt reports pain began around 11am with no relief. Pt is anxious at time of triage. he adds that he has been having bernie colored stools for last week. ) Acuity: Unknown Type of Exam: Unknown FINDINGS: Frontal and lateral views of the chest are submitted for review. The cardiac silhouette is normal in size. Lung parenchyma is clear without focal airspace consolidation, sizeable pleural effusion, or pneumothorax. Trachea is midline. Osseous structures and soft tissues are grossly intact. No evidence for acute cardiopulmonary pathology.      Ct Abdomen Pelvis W Iv Contrast Additional Contrast? None    Result Date: 2/29/2020  EXAMINATION: CT OF THE ABDOMEN AND PELVIS WITH CONTRAST 2/29/2020 1:39 am TECHNIQUE: CT of the abdomen and pelvis was performed with the administration of intravenous contrast. Multiplanar reformatted images are provided for review. Dose modulation, iterative reconstruction, and/or weight based adjustment of the mA/kV was utilized to reduce the radiation dose to as low as reasonably achievable. COMPARISON: None. HISTORY: ORDERING SYSTEM PROVIDED HISTORY: luq pain, full feeling, pale stool TECHNOLOGIST PROVIDED HISTORY: Reason for exam:->luq pain, full feelling, pale stool Additional Contrast?->None Reason for Exam: Chest Pain (left sided chest pain \" pushing\" along left side of chest and upper ABD. Pt reports pain began around 11am with no relief. Pt is anxious at time of triage. he adds that he has been having bernie colored stools for last week. ) Acuity: Acute Type of Exam: Initial FINDINGS: LOWER CHEST Lung bases: Clear lung bases. Normal included heart and pericardium. ABDOMEN Liver: Normal liver size, contour and parenchymal attenuation. Gallbladder: Cholelithiasis. Biliary: No intra or extrahepatic bile duct dilatation. Pancreas: Normal. Pancreatic tail is slightly folded upon itself. Spleen: Normal. Adrenals: Normal. Kidneys: Kidneys are symmetric in size and parenchymal enhancement. No hydronephrosis or nephrolithiasis. GI Tract: Normal distal esophagus, stomach and duodenal sweep. No apparent bowel wall thickening or obstruction. Normal appendix. Peritoneum/Retroperitoneum: Scattered prominent mesenteric lymph nodes. No free fluid or free air. Vascular: Normal caliber abdominal aorta and IVC. Patent portal, superior mesenteric and splenic veins. PELVIS Genitourinary: Normal urinary bladder. Normal reproductive organs. Other: No free fluid. No enlarged lymph nodes. MUSCULOSKELETAL Bones and Soft Tissues: Normal bones and soft tissues. No acute abdominopelvic findings. Cholelithiasis without evidence of cholecystitis or bile duct dilatation. Normal appendix. Scattered prominent mesenteric lymph nodes are nonspecific, presumably reactive.   Any comparison imaging would be helpful if available. Otherwise, consider CT abdomen pelvis in 3-6 months. MEDICAL DECISION MAKING / ED COURSE:      PROCEDURES:   Procedures    None    Patient was given:  Medications - No data to display     PERC Rule:  Applicable in this patient who has low clinical suspicion for pulmonary embolism. Age < 48years old: Yes  Heart rate < 100 bpm: Yes  Oxygen saturation > 95%: Yes  Hemoptysis: No  Exogenous estrogen use: No  Prior history of DVT or PE: No  Unilateral leg swelling: No  Surgery or significant trauma in the past 4 weeks: No    Based on the above, PE can effectively be ruled out without further testing. Patient presented with some sinus congestion and dyspnea. Lung sounds are clear to auscultation he does not feel like he is wheezing at this time. Vitals are unremarkable. No ongoing medical issues. He is nontoxic in appearance. Chest x-ray imaging is unremarkable. Low suspicion for bacterial pneumonia, pneumothorax, pulmonary embolus or other emergent etiology. He is educated that this possibly could be coronavirus and he is at low risk and healthy so will quarantine himself at home for 14 days and had paperwork signed at discharge for self quarantine and understood his strict return precautions of when to return back to the emergency department. Educated on symptomatic treatment at home. Will be given inhaler as he has been having some intermittent wheezing at home. Understood his instructions at discharge and was stable discharge. Management decisions relating to this patient encounter were made during the ZKQVR-[id number] public health emergency. As a result, admission to the hospital and further ED observation/treatment pose increased risk due to multiple factors. At this point in time, the risk of hospital admission or further ED observation/treatment of this patient is deemed to be greater than the risk of discharge.  I engaged in a shared decision-making conversation with the patient. The patient agrees and wishes to go home. The patient was counseled to closely monitor their symptoms, and to return immediately to the Emergency Department for any difficulty breathing, confusion, dizziness or passing out, vomiting, chest pain, high fevers, weakness, or any other new or concerning symptoms. There is no evidence of emergent medical condition at this time, and the patient will be discharged in good condition. The patient was specifically advised their symptoms are consistent with possible COVID-19 infection, and they need to self-isolate at home and restrict any activities outside of their home except for seeking medical care, and to wear a facemask whenever around others. The patient was provided specific instructions related to COVID-19, along with recommendations for proper respiratory hygiene and instructions on prevention of transmission of infection to others. The patient tolerated their visit well. I evaluated the patient. The physician was available for consultation as needed. The patient and / or the family were informed of the results of any tests, a time was given to answer questions, a plan was proposed and they agreed with plan. CLINICAL IMPRESSION:  1. Nasal congestion    2. Dyspnea and respiratory abnormalities        DISPOSITION Decision To Discharge 03/27/2020 05:00:26 PM      PATIENT REFERRED TO:  Trent Yañez MD  [address]  [address] [facility]  [phone]    Schedule an appointment as soon as possible for a visit   For re-check, As needed    [facility] Emergency Department  [address] [facility]  [phone]    As needed      DISCHARGE MEDICATIONS:  New Prescriptions    ALBUTEROL SULFATE  (90 BASE) MCG/ACT INHALER    Use 2 puffs 4 times daily for 7 days then as needed for wheezing. Dispense with Spacer and instruct in use.   At patient's preference may use 60 dose

## 2020-03-28 ENCOUNTER — CARE COORDINATION (OUTPATIENT)
Dept: FAMILY MEDICINE CLINIC | Age: 20
End: 2020-03-28

## 2020-03-28 LAB
EKG ATRIAL RATE: 76 BPM
EKG DIAGNOSIS: NORMAL
EKG P AXIS: 45 DEGREES
EKG P-R INTERVAL: 160 MS
EKG Q-T INTERVAL: 370 MS
EKG QRS DURATION: 94 MS
EKG QTC CALCULATION (BAZETT): 416 MS
EKG R AXIS: 39 DEGREES
EKG T AXIS: 40 DEGREES
EKG VENTRICULAR RATE: 76 BPM

## 2020-03-28 PROCEDURE — 93010 ELECTROCARDIOGRAM REPORT: CPT | Performed by: INTERNAL MEDICINE

## 2020-03-28 NOTE — CARE COORDINATION
ACM placed call to follow up with patient re: recent ED visit. HIPPA compliant message left with purpose of call and contact information.     Ronaldo Burr RN, MSN  Ambulatory Care Manager  [phone]

## 2020-03-29 ENCOUNTER — HOSPITAL ENCOUNTER (EMERGENCY)
Age: 20
Discharge: HOME | End: 2020-03-29
Attending: EMERGENCY MEDICINE
Payer: MEDICAID

## 2020-03-29 ENCOUNTER — APPOINTMENT (OUTPATIENT)
Dept: GENERAL RADIOLOGY | Age: 20
End: 2020-03-29
Payer: MEDICAID

## 2020-03-29 ENCOUNTER — TELEPHONE (OUTPATIENT)
Dept: FAMILY MEDICINE CLINIC | Age: 20
End: 2020-03-29

## 2020-03-29 VITALS
OXYGEN SATURATION: 100 % | SYSTOLIC BLOOD PRESSURE: 130 MMHG | DIASTOLIC BLOOD PRESSURE: 73 MMHG | BODY MASS INDEX: 27.12 KG/M2 | HEART RATE: 78 BPM | RESPIRATION RATE: 17 BRPM | WEIGHT: 200 LBS | TEMPERATURE: 98.2 F

## 2020-03-29 LAB
BASOPHILS ABSOLUTE: 0 K/UL (ref 0–0.2)
BASOPHILS RELATIVE PERCENT: 0.3 %
EOSINOPHILS ABSOLUTE: 0 K/UL (ref 0–0.6)
EOSINOPHILS RELATIVE PERCENT: 0.2 %
HCT VFR BLD CALC: 40.7 % (ref 40.5–52.5)
HEMOGLOBIN: 13.6 G/DL (ref 13.5–17.5)
LYMPHOCYTES ABSOLUTE: 1.2 K/UL (ref 1–5.1)
LYMPHOCYTES RELATIVE PERCENT: 14.4 %
MCH RBC QN AUTO: 30.2 PG (ref 26–34)
MCHC RBC AUTO-ENTMCNC: 33.4 G/DL (ref 31–36)
MCV RBC AUTO: 90.6 FL (ref 80–100)
MONOCYTES ABSOLUTE: 0.5 K/UL (ref 0–1.3)
MONOCYTES RELATIVE PERCENT: 6.3 %
NEUTROPHILS ABSOLUTE: 6.6 K/UL (ref 1.7–7.7)
NEUTROPHILS RELATIVE PERCENT: 78.8 %
PDW BLD-RTO: 13.3 % (ref 12.4–15.4)
PLATELET # BLD: 259 K/UL (ref 135–450)
PMV BLD AUTO: 8.5 FL (ref 5–10.5)
RBC # BLD: 4.5 M/UL (ref 4.2–5.9)
REASON FOR REJECTION: NORMAL
REJECTED TEST: NORMAL
WBC # BLD: 8.4 K/UL (ref 4–11)

## 2020-03-29 PROCEDURE — 99285 EMERGENCY DEPT VISIT HI MDM: CPT

## 2020-03-29 PROCEDURE — 36415 COLL VENOUS BLD VENIPUNCTURE: CPT

## 2020-03-29 PROCEDURE — 71045 X-RAY EXAM CHEST 1 VIEW: CPT

## 2020-03-29 PROCEDURE — 85025 COMPLETE CBC W/AUTO DIFF WBC: CPT

## 2020-03-29 PROCEDURE — 93005 ELECTROCARDIOGRAM TRACING: CPT | Performed by: EMERGENCY MEDICINE

## 2020-03-29 RX ORDER — ALBUTEROL SULFATE 90 UG/1
AEROSOL, METERED RESPIRATORY (INHALATION)
Qty: 1 INHALER | Refills: 0 | Status: SHIPPED | OUTPATIENT
Start: 2020-03-29 | End: 2020-06-03 | Stop reason: SDUPTHER

## 2020-03-29 NOTE — ED PROVIDER NOTES
[address] PROVIDER NOTE    Patient Identification  Pt Name: Valeri Wall  MRN: [id number]  Marthagfstephon 2000  Date of evaluation: 3/29/2020  Provider: Jadyn Jimenez MD  PCP: Dez Mark MD    Chief Complaint  Shortness of Breath (Patient in with complaints of cough, sob and some chest pain. States he has been exposed to covid. )      HPI  History provided by patient   This is a 22 y.o. male who presents to the ED for shortness of breath, cough, chest discomfort when coughing. 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 Symptoms moderate intensity. Had resolved sore throat. Feels congested    ROS  10 systems reviewed, pertinent positives/negatives per HPI otherwise noted to be negative. I have reviewed the following nursing documentation:  Allergies: Patient has no known allergies. Past medical history:   Past Medical History:   Diagnosis Date    Elevated liver enzymes 2/28/2020     Past surgical history:   Past Surgical History:   Procedure Laterality Date    WISDOM TOOTH EXTRACTION  01/17/2020       Home medications:   Current Discharge Medication List      CONTINUE these medications which have NOT CHANGED    Details   Cyanocobalamin (VITAMIN B12 PO) Take by mouth      Multiple Vitamins-Minerals (VITAMIN D3 COMPLETE PO) Take by mouth      GARLIC PO Take by mouth      omeprazole (PRILOSEC) 20 MG delayed release capsule Take 1 capsule by mouth 2 times daily  Qty: 30 capsule, Refills: 3    Associated Diagnoses: Left upper quadrant pain      sucralfate (CARAFATE) 1 GM tablet Take 1 tablet by mouth 4 times daily  Qty: 120 tablet, Refills: 3    Associated Diagnoses: Left upper quadrant pain      ibuprofen (ADVIL;MOTRIN) 400 MG tablet Take 1 tablet by mouth every 6 hours as needed for Pain  Qty: 20 tablet, Refills: 0             Social history:  reports that he has never smoked.  He has never used smokeless tobacco. He reports that he does not drink alcohol or use drugs. Family history:    Family History   Problem Relation Age of Onset    High Cholesterol Mother     High Blood Pressure Mother     Cancer Maternal Grandmother          Exam  ED Triage Vitals [03/29/20 1552]   BP Temp Temp Source Pulse Resp SpO2 Height Weight   (!) 158/95 98.3 °F (36.8 °C) Oral 103 16 98 % -- 200 lb (90.7 kg)     Nursing note and vitals reviewed. Constitutional: In no acute distress  HENT:      Head: Normocephalic      Ears: External ears normal.      Nose: Nose normal.     Mouth: Membrane mucosa moist   Eyes: No discharge. Neck: Supple. Trachea midline. Cardiovascular: Regular rate. Warm extremities  Pulmonary/Chest: Effort normal. No respiratory distress. CTAB. Abdominal: Soft. No distension. Nontender  : Deferred  Rectal: Deferred   Musculoskeletal: Moves all extremities. No gross deformity. Neurological: Alert and oriented. Face symmetric. Speech is clear. Skin: Warm and dry. No rash. Psychiatric: Normal mood and affect. Behavior is normal.    Procedures        Radiology  XR CHEST PORTABLE   Final Result   No acute abnormality detected. Labs  Results for orders placed or performed during the hospital encounter of 03/29/20   SPECIMEN REJECTION   Result Value Ref Range    Rejected Test cbcwd     Reason for Rejection see below    CBC Auto Differential   Result Value Ref Range    WBC 8.4 4.0 - 11.0 K/uL    RBC 4.50 4. 20 - 5.90 M/uL    Hemoglobin 13.6 13.5 - 17.5 g/dL    Hematocrit 40.7 40.5 - 52.5 %    MCV 90.6 80.0 - 100.0 fL    MCH 30.2 26.0 - 34.0 pg    MCHC 33.4 31.0 - 36.0 g/dL    RDW 13.3 12.4 - 15.4 %    Platelets 930 357 - 510 K/uL    MPV 8.5 5.0 - 10.5 fL    Neutrophils % 78.8 %    Lymphocytes % 14.4 %    Monocytes % 6.3 %    Eosinophils % 0.2 %    Basophils % 0.3 %    Neutrophils Absolute 6.6 1.7 - 7.7 K/uL    Lymphocytes Absolute 1.2 1.0 - 5.1 K/uL    Monocytes Absolute 0.5 0.0 - 1.3 K/uL    Eosinophils Absolute 0.0 0.0 -

## 2020-03-29 NOTE — TELEPHONE ENCOUNTER
Patient left a voice message via [employer]. I am the on call physician for this weekend. This patient is a patient of mine. He informed me that he went to [facility] ER on 3/27/2020 for dyspnea. He stated that one of his co-workers (allegedly) was positive for COVID-19. When he was at the ER, his vital signs were all normal and had O2 sat on room air of 99%. No lab work was done of any kind. Had CXR and it was normal. EKG was normal. He was sent home and advised to self-quarantine for 14 days and do supportive care. Patient and an unknown female family member demanded that I write a rx of either Hydroxychloroquine or Chloroquine for the patient. I informed him that there are currently no FDA approved treatments for COVID-19 and that there are several ongoing research trials with some involving Chloroquine or Hydroxychloroquine. Despite me telling him this, he remained insistent that I write it and that he would be willing to pay it out of pocket. I then told him that I would call his pharmacy and speak the pharmacist directly. His pharmacist told me (and later e-mailed me) the [address] [facility] [employer] board's email that was sent out on March 26th detailing the only 2 reasons for prescription approval for COVID-19: 1) recently hospitalized for known COVID-19 and was started on either medication and continuation of outpatient treatment; 2) participating in a clinical trial. He was then informed based on this information that unfortunately he would not qualify for a rx of Hydroxychloroquine or Chloroquine. Patient also requested that I write an order for COVID-19. I informed him that first, COVID-19 testing is not readily available and that not all will be tested. I also informed him, who testing is reserved for at this moment. During our telephone encounter, he was able to enunciate his words clearly and did not sound short of breath.  I made patient aware about this fact and also informed him that if he were

## 2020-03-30 ENCOUNTER — TELEPHONE (OUTPATIENT)
Dept: FAMILY MEDICINE CLINIC | Age: 20
End: 2020-03-30

## 2020-03-30 LAB
EKG ATRIAL RATE: 87 BPM
EKG DIAGNOSIS: NORMAL
EKG P AXIS: 64 DEGREES
EKG P-R INTERVAL: 158 MS
EKG Q-T INTERVAL: 344 MS
EKG QRS DURATION: 92 MS
EKG QTC CALCULATION (BAZETT): 413 MS
EKG R AXIS: 24 DEGREES
EKG T AXIS: 40 DEGREES
EKG VENTRICULAR RATE: 87 BPM

## 2020-03-30 PROCEDURE — 93010 ELECTROCARDIOGRAM REPORT: CPT | Performed by: INTERNAL MEDICINE

## 2020-04-01 ENCOUNTER — CARE COORDINATION (OUTPATIENT)
Dept: FAMILY MEDICINE CLINIC | Age: 20
End: 2020-04-01

## 2020-05-13 ENCOUNTER — VIRTUAL VISIT (OUTPATIENT)
Dept: FAMILY MEDICINE CLINIC | Age: 20
End: 2020-05-13
Payer: MEDICAID

## 2020-05-13 VITALS — TEMPERATURE: 101.8 F | BODY MASS INDEX: 28.44 KG/M2 | WEIGHT: 210 LBS | HEIGHT: 72 IN

## 2020-05-13 PROCEDURE — 99213 OFFICE O/P EST LOW 20 MIN: CPT | Performed by: FAMILY MEDICINE

## 2020-05-13 RX ORDER — AZITHROMYCIN 250 MG/1
250 TABLET, FILM COATED ORAL SEE ADMIN INSTRUCTIONS
Qty: 6 TABLET | Refills: 0 | Status: SHIPPED | OUTPATIENT
Start: 2020-05-13 | End: 2020-05-18

## 2020-05-13 RX ORDER — ONDANSETRON 4 MG/1
4 TABLET, FILM COATED ORAL 3 TIMES DAILY PRN
Qty: 9 TABLET | Refills: 1 | Status: SHIPPED | OUTPATIENT
Start: 2020-05-13

## 2020-05-13 NOTE — PROGRESS NOTES
Jose Angel Gann   22 y.o. male   2000    HPI:  Today's visit is a virtual visit (via [email or web address] me). This patient was last seen by me in clinic on 3/16/2020. He contacted me when I happened to be on call for the weekend on 3/29/2020 complaining of difficulty breathing. He had gone to [facility] ER on 3/27 & 3/30 for same issue and had CXR on both visits and were unremarkable. Only lab work was a CBC on 3/29 visit and was unremarkable. SYMPTOMS REVIEW:    Symptom duration: (# of days)  [] 1   [] 2   [] 3   [] 4   [x] 5   [] 6   [] 7   [] 8   [] 9   [] 10   [] 11   [] 12   [] 13 [] 14 +    Symptom course:   [x] Worsening     [] Stable     [] Improving     Symptoms:  [x] Fever  [] Subjective (not measured)  [] Measured (result: range:  F; t-max 101.8 F)  [x] Chills - first & most prominent symptom   [] Cough              [] Dry cough               [] Productive cough               [] Coughing up blood  [x] Nasal congestion  [] Runny nose  [] Chest discomfort:                 [] Chest tightness                 [] Chest congestion                 [] Pleuritic chest pain                 [] Cardiac type chest pain  [x] Shortness of breath (SOB):                [] SOB at rest                [] SOB on exertion                [x] SOB on lying down  [] Fatigue (prolonged)  [x] Headaches  [x] Tolerable   [] Severe  [x] Sore throat  [] Difficulty swallowing  [] Pain with swallowing  [x] Muscle aches - back  [x] Nausea  [x] Decreased appetite  [] Vomiting  []Unable to keep fluids down  [x] Diarrhea  [] Bloody  [] Watery  [x] Loose - 2-3x/day  [] Loss or significant decrease in taste  [x] Loss or significant decrease in smell  [] Rash - location(s):     [] OTHER SYMPTOMS:      MEDICATIONS USED:  [] NSAID             [] Ibuprofen (Motrin)             [] Naproxen (Aleve)  [x] Tylenol - last dose (11 AM).  Takes it three times a day  [] Other: Vitamin A, C, E    RISK FACTORS:  [] Sick contacts:   [] Pregnant or possibly pregnant  [] Age 72 and older  [] Diabetes  [] Heart disease  [] Asthma  [] COPD/Other chronic lung diseases  [] Active cancer  [] On chemotherapy/immunosuppressive drugs  [] Taking oral steroids  [] History of lymphoma/leukemia  [] Close contact with a lab confirmed COVID-19 patient within 14 days of symptom onset  [] History of travel from affected geographical areas within 14 days of symptom onset  [] Health care worker exposure w/ no symptoms  [] Health care worker exposure symptomatic  [] History of or currently smoking  [] Other    EMPLOYMENT HISTORY:  [x] Currently employed - occupation: [address] (not ) in [employer] [employer] and works from home.  [] Currently unemployed - prior occupation:  [] Retired - prior occupation:  [] Other:       TRAVEL HISTORY: none besides going to grocery store or post office. No Known Allergies     Current Outpatient Medications on File Prior to Visit   Medication Sig Dispense Refill    albuterol sulfate  (90 Base) MCG/ACT inhaler Use 2 puffs 4 times daily for 7 days then as needed for wheezing. Dispense with Spacer and instruct in use. At patient's preference may use 60 dose MDI. May Sub Pro-Air or Proventil as needed per insurance. 1 Inhaler 0    Cyanocobalamin (VITAMIN B12 PO) Take by mouth      Multiple Vitamins-Minerals (VITAMIN D3 COMPLETE PO) Take by mouth      GARLIC PO Take by mouth      omeprazole (PRILOSEC) 20 MG delayed release capsule Take 1 capsule by mouth 2 times daily 30 capsule 3    sucralfate (CARAFATE) 1 GM tablet Take 1 tablet by mouth 4 times daily 120 tablet 3    ibuprofen (ADVIL;MOTRIN) 400 MG tablet Take 1 tablet by mouth every 6 hours as needed for Pain 20 tablet 0     No current facility-administered medications on file prior to visit.        Family History   Problem Relation Age of Onset    High Cholesterol Mother     High Blood Pressure Mother     Cancer Maternal Grandmother      Social History normal.         ASSESSMENT/PLAN:   Jairon Harvey was seen today for fever. Diagnoses and all orders for this visit:    Suspected COVID-19 virus infection  Comments:  Advised to go to [facility] clinic asap because the sooner he can be tested the sooner we can get results back. He was informed that there's no guarantee that he will be tested for COVID-19. I repeatedly stated this. He was also informed that it's important to know the results because it's highly contagious and people, who is in contact with regularly at risk of getting it from him. He was also informed that viruses don't respond to abx. Acute bacterial sinusitis  -     azithromycin (ZITHROMAX) 250 MG tablet; Take 1 tablet by mouth See Admin Instructions for 5 days 500mg on day 1 followed by 250mg on days 2 - 5    Acute viral syndrome  Comments:  Recommended keeping himself well-hydrated and consuming a well-balanced meal.    Nausea without vomiting  -     ondansetron (ZOFRAN) 4 MG tablet; Take 1 tablet by mouth 3 times daily as needed for Nausea or Vomiting    Vitamin D deficiency  Comments:  Continue Vit D supplement. There are no discontinued medications. Patient was informed that whether starting and/or adding a new medication as well as continuing any current medication(s) that the benefits and risks have to be considered and weighed over. Patient was also informed that there are no medications that no side effects. The most common adverse effects of medications were addressed at today's visit. Estimated length of time of today's visit: 15 minutes    Follow-up: Return in about 1 week (around 5/20/2020) for suspected covid-19. Patient was informed that if his or her symptoms worsen to return here or go to nearest ER if symptoms significantly worsen.      Disclaimer:  Shannon Saldana is a 22 y.o. male is being evaluated by a virtual visit (video visit) or telephone (without video) encounter to address their concern(s) as mentioned

## 2020-05-14 ENCOUNTER — OFFICE VISIT (OUTPATIENT)
Dept: PRIMARY CARE CLINIC | Age: 20
End: 2020-05-14
Payer: MEDICAID

## 2020-05-14 ENCOUNTER — TELEPHONE (OUTPATIENT)
Dept: PRIMARY CARE CLINIC | Age: 20
End: 2020-05-14

## 2020-05-14 VITALS — TEMPERATURE: 99.3 F | OXYGEN SATURATION: 99 % | HEART RATE: 67 BPM

## 2020-05-14 PROCEDURE — 99213 OFFICE O/P EST LOW 20 MIN: CPT | Performed by: INTERNAL MEDICINE

## 2020-05-14 PROCEDURE — G8428 CUR MEDS NOT DOCUMENT: HCPCS | Performed by: INTERNAL MEDICINE

## 2020-05-14 PROCEDURE — G8419 CALC BMI OUT NRM PARAM NOF/U: HCPCS | Performed by: INTERNAL MEDICINE

## 2020-05-14 PROCEDURE — 1036F TOBACCO NON-USER: CPT | Performed by: INTERNAL MEDICINE

## 2020-05-14 NOTE — TELEPHONE ENCOUNTER
Patient was seen by me yesterday (5/13/2020) via virtual visit. He was advised at that time to be tested for COVID-19 that same day since he lives less than 10 min from [facility] and the flu clinic was still open. He did not follow my recommendations and instead was tested today. He has a follow-up appt already scheduled for 5/20/2020. Ramses Borrego [address]

## 2020-05-14 NOTE — PROGRESS NOTES
grossly intact in visible lower extremities    [] Normal Mood  [] Anxious appearing    [] Depressed appearing  [] Confused appearing      [] Poor short term memory  [] Poor long term memory    [] OTHER:  1}    TESTS ORDERED:    [] POCT FLU  [] POCT STREP  [x] COVID-19 Test sent    TEST RESULTS:    POCT FLU test:  [] Positive  [] Negative  POCT STREP test:  [] Positive  [] Negative  COVID-19 test:  [] Positive  [] Negative    ASSESSMENT:    [] Flu  [] Strep Throat  [] Uncertain Viral Respiratory Infection  [x] Possible COVID-19    PLAN:Rest Fluids Tylenol     [x] Discharge home with written instructions for:  [] Flu management  [] Strep throat management  [] Viral respiratory illness management  [x] Possible COVID-19 infection with self-quarantine and management of symptoms  [] Follow-up with primary care physician or emergency department if worsens  [] Referred to emergency department for evaluation      An  electronic signature was used to authenticate this note.      --Domingo Bell on 5/14/2020 at 1:59 PM

## 2020-05-15 LAB
SARS-COV-2: NOT DETECTED
SOURCE: NORMAL

## 2020-05-19 PROBLEM — K80.20 CHOLELITHIASIS WITHOUT OBSTRUCTION: Status: ACTIVE | Noted: 2020-02-29

## 2020-05-19 NOTE — PROGRESS NOTES
Sebastian Liu   22 y.o. male   2000    HPI:    Chief Complaint   Patient presents with    Covid Testing     Today's visit is a virtual visit (via [employer]. me). This patient was last seen by me on 5/13/2020. He went to [facility] flu clinic the following day (instead of the same day as directed by me) and his COVID-19 result was negative. He is here for follow-up. He stated that since his last visit, he has basically remained at home and has not gone out anywhere except for the grocery store. No other visitors except for his family members in his household have come to visit him. He was prescribed Azithromycin by me on his last visit, which he has completed. Since his last visit, the patient stated that he overall feels much better. His last fever was one week ago and has been checking his temperature periodically. He stated that he was able to acquire a thermometer and keep that home. He did report that he occasionally uses Tylenol more so for headache, but has not used it in some days. He denied cough or nasal congestion, but reported that he is spitting up some clear sputum. He stated that he only eats 1 to maybe 2 meals a day, but that is rather normal for him. He has not heard back from work regarding when his work would reopen up again. He stated that he does have the option to work from home. Patient also stated that he has 2 other family members in the household and they are doing overall and not feeling sick. No Known Allergies     Current Outpatient Medications on File Prior to Visit   Medication Sig Dispense Refill    ondansetron (ZOFRAN) 4 MG tablet Take 1 tablet by mouth 3 times daily as needed for Nausea or Vomiting 9 tablet 1    albuterol sulfate  (90 Base) MCG/ACT inhaler Use 2 puffs 4 times daily for 7 days then as needed for wheezing. Dispense with Spacer and instruct in use. At patient's preference may use 60 dose MDI.  May Sub Pro-Air or Proventil as needed per Estimated length of time of today's visit: 15 minutes    Follow-up: Return if symptoms worsen or fail to improve. . Patient was informed that if his or her symptoms worsen to return here or go to nearest ER if symptoms significantly worsen. Disclaimer:  Chris Gamboa is a 22 y.o. male is being evaluated by a virtual visit (video visit) or telephone (without video) encounter to address their concern(s) as mentioned above. Prior to arranging this appointment, the patient was made aware of the need and importance to schedule the visit in this manner given the current ongoing COVID-19 pandemic. After this discussion, the patient acknowledged understanding and agreed to today's virtual visit encounter. A caregiver was present when appropriate. Due to this being a TeleHealth encounter (during the [facility]- public health emergency), evaluation of the following organ systems was overall limited: Vitals/Constitutional/EENT/Resp/CV/GI//MS/Neuro/Skin/Heme-Lymph-Imm. As a result, establishing a diagnosis(s) and formulating a plan of care may be overall more difficult and complicated compared to conventional (face-to-face) office visits. Pursuant to the emergency declaration under the [facility], [address] [place] [address] authority and the [employer] Act, this virtual visit was conducted with the patient's (and/or legal guardian's) consent, to reduce the patient's risk (as well as others) of exposure to COVID-19 and to continue to maintain and provide necessary medical care. The patient (and/or legal guardian) has also been advised to contact this office for worsening conditions or problems, and seek emergency medical treatment and/or call 911 if deemed necessary. Services were provided through either a video synchronous discussion virtually or via telephone (without video) to substitute for in-person clinic visit.  Patient and provider

## 2020-05-20 ENCOUNTER — VIRTUAL VISIT (OUTPATIENT)
Dept: FAMILY MEDICINE CLINIC | Age: 20
End: 2020-05-20
Payer: MEDICAID

## 2020-05-20 VITALS — BODY MASS INDEX: 28.44 KG/M2 | WEIGHT: 210 LBS | HEIGHT: 72 IN

## 2020-05-20 PROCEDURE — 99213 OFFICE O/P EST LOW 20 MIN: CPT | Performed by: FAMILY MEDICINE

## 2020-05-20 ASSESSMENT — ENCOUNTER SYMPTOMS
SHORTNESS OF BREATH: 0
CONSTIPATION: 0
VOICE CHANGE: 0
CHEST TIGHTNESS: 0
ABDOMINAL PAIN: 0
COUGH: 0
ABDOMINAL DISTENTION: 0
TROUBLE SWALLOWING: 0
DIARRHEA: 0
BLOOD IN STOOL: 0
RHINORRHEA: 0
VOMITING: 0
SINUS PRESSURE: 0
WHEEZING: 0
SORE THROAT: 0
NAUSEA: 0
BACK PAIN: 0

## 2020-05-22 ENCOUNTER — HOSPITAL ENCOUNTER (EMERGENCY)
Age: 20
Discharge: HOME | End: 2020-05-22
Payer: MEDICAID

## 2020-05-22 ENCOUNTER — APPOINTMENT (OUTPATIENT)
Dept: GENERAL RADIOLOGY | Age: 20
End: 2020-05-22
Payer: MEDICAID

## 2020-05-22 VITALS
OXYGEN SATURATION: 100 % | RESPIRATION RATE: 18 BRPM | SYSTOLIC BLOOD PRESSURE: 135 MMHG | TEMPERATURE: 98.1 F | HEART RATE: 91 BPM | WEIGHT: 210 LBS | BODY MASS INDEX: 28.44 KG/M2 | HEIGHT: 72 IN | DIASTOLIC BLOOD PRESSURE: 79 MMHG

## 2020-05-22 LAB
A/G RATIO: 1.8 (ref 1.1–2.2)
ALBUMIN SERPL-MCNC: 4.2 G/DL (ref 3.4–5)
ALP BLD-CCNC: 96 U/L (ref 40–129)
ALT SERPL-CCNC: 30 U/L (ref 10–40)
ANION GAP SERPL CALCULATED.3IONS-SCNC: 8 MMOL/L (ref 3–16)
AST SERPL-CCNC: 23 U/L (ref 15–37)
BASOPHILS ABSOLUTE: 0 K/UL (ref 0–0.2)
BASOPHILS RELATIVE PERCENT: 0.7 %
BILIRUB SERPL-MCNC: <0.2 MG/DL (ref 0–1)
BUN BLDV-MCNC: 15 MG/DL (ref 7–20)
CALCIUM SERPL-MCNC: 9.5 MG/DL (ref 8.3–10.6)
CHLORIDE BLD-SCNC: 103 MMOL/L (ref 99–110)
CO2: 24 MMOL/L (ref 21–32)
CREAT SERPL-MCNC: 0.7 MG/DL (ref 0.9–1.3)
D DIMER: <200 NG/ML DDU (ref 0–229)
EOSINOPHILS ABSOLUTE: 0.1 K/UL (ref 0–0.6)
EOSINOPHILS RELATIVE PERCENT: 2.1 %
GFR AFRICAN AMERICAN: >60
GFR NON-AFRICAN AMERICAN: >60
GLOBULIN: 2.4 G/DL
GLUCOSE BLD-MCNC: 102 MG/DL (ref 70–99)
HCT VFR BLD CALC: 39.1 % (ref 40.5–52.5)
HEMOGLOBIN: 13 G/DL (ref 13.5–17.5)
LYMPHOCYTES ABSOLUTE: 2.8 K/UL (ref 1–5.1)
LYMPHOCYTES RELATIVE PERCENT: 41.1 %
MCH RBC QN AUTO: 30.1 PG (ref 26–34)
MCHC RBC AUTO-ENTMCNC: 33.3 G/DL (ref 31–36)
MCV RBC AUTO: 90.4 FL (ref 80–100)
MONOCYTES ABSOLUTE: 0.5 K/UL (ref 0–1.3)
MONOCYTES RELATIVE PERCENT: 7.9 %
NEUTROPHILS ABSOLUTE: 3.2 K/UL (ref 1.7–7.7)
NEUTROPHILS RELATIVE PERCENT: 48.2 %
PDW BLD-RTO: 13.3 % (ref 12.4–15.4)
PLATELET # BLD: 284 K/UL (ref 135–450)
PMV BLD AUTO: 8 FL (ref 5–10.5)
POTASSIUM REFLEX MAGNESIUM: 4.3 MMOL/L (ref 3.5–5.1)
RBC # BLD: 4.33 M/UL (ref 4.2–5.9)
REASON FOR REJECTION: NORMAL
REJECTED TEST: NORMAL
SODIUM BLD-SCNC: 135 MMOL/L (ref 136–145)
TOTAL PROTEIN: 6.6 G/DL (ref 6.4–8.2)
TROPONIN: <0.01 NG/ML
WBC # BLD: 6.7 K/UL (ref 4–11)

## 2020-05-22 PROCEDURE — 85379 FIBRIN DEGRADATION QUANT: CPT

## 2020-05-22 PROCEDURE — 71045 X-RAY EXAM CHEST 1 VIEW: CPT

## 2020-05-22 PROCEDURE — U0002 COVID-19 LAB TEST NON-CDC: HCPCS

## 2020-05-22 PROCEDURE — 80053 COMPREHEN METABOLIC PANEL: CPT

## 2020-05-22 PROCEDURE — 93005 ELECTROCARDIOGRAM TRACING: CPT | Performed by: EMERGENCY MEDICINE

## 2020-05-22 PROCEDURE — 99285 EMERGENCY DEPT VISIT HI MDM: CPT

## 2020-05-22 PROCEDURE — 85025 COMPLETE CBC W/AUTO DIFF WBC: CPT

## 2020-05-22 PROCEDURE — 84484 ASSAY OF TROPONIN QUANT: CPT

## 2020-05-22 PROCEDURE — U0003 INFECTIOUS AGENT DETECTION BY NUCLEIC ACID (DNA OR RNA); SEVERE ACUTE RESPIRATORY SYNDROME CORONAVIRUS 2 (SARS-COV-2) (CORONAVIRUS DISEASE [COVID-19]), AMPLIFIED PROBE TECHNIQUE, MAKING USE OF HIGH THROUGHPUT TECHNOLOGIES AS DESCRIBED BY CMS-2020-01-R: HCPCS

## 2020-05-22 RX ORDER — PREDNISONE 10 MG/1
40 TABLET ORAL DAILY
Qty: 20 TABLET | Refills: 0 | Status: SHIPPED | OUTPATIENT
Start: 2020-05-22 | End: 2020-05-27

## 2020-05-22 RX ORDER — ALBUTEROL SULFATE 90 UG/1
AEROSOL, METERED RESPIRATORY (INHALATION)
Qty: 1 INHALER | Refills: 0 | Status: SHIPPED | OUTPATIENT
Start: 2020-05-22

## 2020-05-22 RX ORDER — BENZONATATE 100 MG/1
100 CAPSULE ORAL 3 TIMES DAILY PRN
Qty: 20 CAPSULE | Refills: 0 | Status: SHIPPED | OUTPATIENT
Start: 2020-05-22 | End: 2020-06-03

## 2020-05-22 ASSESSMENT — ENCOUNTER SYMPTOMS
COLOR CHANGE: 0
NAUSEA: 0
COUGH: 1
CHEST TIGHTNESS: 1
STRIDOR: 0
WHEEZING: 0
SHORTNESS OF BREATH: 1
CONSTIPATION: 0
BACK PAIN: 0
DIARRHEA: 0
ABDOMINAL PAIN: 0
VOMITING: 0

## 2020-05-22 ASSESSMENT — PAIN SCALES - GENERAL: PAINLEVEL_OUTOF10: 3

## 2020-05-22 NOTE — ED NOTES
Patient has been educated on Self Isolation. Patient verbalized understanding. Patient has signed a Self Isolation contract.        Brianne Roth RN  05/22/20 7534

## 2020-05-22 NOTE — ED PROVIDER NOTES
Denies any recent travel, trips, surgery or immobilization. Denies history of DVT or PE. States is not a smoker. Denies history of asthma or COPD. Nursing Notes were all reviewed and agreed with or any disagreements were addressed in the HPI. REVIEW OF SYSTEMS    (2-9 systems for level 4, 10 or more for level 5)     Review of Systems   Constitutional: Positive for chills and fever. Negative for activity change, appetite change and diaphoresis. Respiratory: Positive for cough, chest tightness and shortness of breath. Negative for wheezing and stridor. Cardiovascular: Negative. Negative for chest pain, palpitations and leg swelling. Gastrointestinal: Negative for abdominal pain, constipation, diarrhea, nausea and vomiting. Genitourinary: Negative for decreased urine volume, difficulty urinating, dysuria, flank pain, frequency, hematuria and urgency. Musculoskeletal: Negative for arthralgias, back pain, myalgias, neck pain and neck stiffness. Skin: Negative for color change, pallor, rash and wound. Neurological: Negative for dizziness, light-headedness and headaches. Positives and Pertinent negatives as per HPI. Except as noted above in the ROS, all other systems were reviewed and negative. PAST MEDICAL HISTORY     Past Medical History:   Diagnosis Date    Elevated liver enzymes 2/28/2020         SURGICAL HISTORY     Past Surgical History:   Procedure Laterality Date    WISDOM TOOTH EXTRACTION  01/17/2020         CURRENTMEDICATIONS       Previous Medications    ALBUTEROL SULFATE  (90 BASE) MCG/ACT INHALER    Use 2 puffs 4 times daily for 7 days then as needed for wheezing. Dispense with Spacer and instruct in use. At patient's preference may use 60 dose MDI. May Sub Pro-Air or Proventil as needed per insurance.     CYANOCOBALAMIN (VITAMIN B12 PO)    Take by mouth    GARLIC PO    Take by mouth    IBUPROFEN (ADVIL;MOTRIN) 400 MG TABLET    Take 1 tablet by mouth every 6 hours Normal breath sounds. No stridor. No wheezing, rhonchi or rales. Chest:      Chest wall: No tenderness. Abdominal:      General: Abdomen is flat. Bowel sounds are normal. There is no distension. Palpations: Abdomen is soft. There is no mass. Tenderness: There is no abdominal tenderness. There is no right CVA tenderness, left CVA tenderness, guarding or rebound. Hernia: No hernia is present. Musculoskeletal: Normal range of motion. Lymphadenopathy:      Cervical: No cervical adenopathy. Skin:     General: Skin is warm and dry. Coloration: Skin is not pale. Findings: No erythema. Neurological:      Mental Status: He is alert and oriented to person, place, and time. Psychiatric:         Behavior: Behavior normal.         DIAGNOSTIC RESULTS   LABS:    Labs Reviewed   CBC WITH AUTO DIFFERENTIAL - Abnormal; Notable for the following components:       Result Value    Hemoglobin 13.0 (*)     Hematocrit 39.1 (*)     All other components within normal limits    Narrative:     Performed at:  [facility] [address],  [place], [address] [employer]   Phone [phone]   COMPREHENSIVE METABOLIC PANEL W/ REFLEX TO MG FOR LOW K - Abnormal; Notable for the following components:    Sodium 135 (*)     Glucose 102 (*)     CREATININE 0.7 (*)     All other components within normal limits    Narrative:     Performed at:  [facility] [address],  [place], [address]   Phone [phone]   TROPONIN    Narrative:     Performed at:  [facility] [address],  [place], [address]   Phone [phone]   D-DIMER, QUANTITATIVE    Narrative:     Performed at:  [facility] [address] [facility],  [place], [address]   Phone [phone]       All other labs were within normal range or not returned as of this dictation. EKG:  All EKG's are interpreted by the

## 2020-05-23 ENCOUNTER — CARE COORDINATION (OUTPATIENT)
Dept: CARE COORDINATION | Age: 20
End: 2020-05-23

## 2020-05-23 LAB
EKG ATRIAL RATE: 88 BPM
EKG DIAGNOSIS: NORMAL
EKG P AXIS: 75 DEGREES
EKG P-R INTERVAL: 160 MS
EKG Q-T INTERVAL: 348 MS
EKG QRS DURATION: 94 MS
EKG QTC CALCULATION (BAZETT): 421 MS
EKG R AXIS: 50 DEGREES
EKG T AXIS: 46 DEGREES
EKG VENTRICULAR RATE: 88 BPM
SARS-COV-2, PCR: NOT DETECTED

## 2020-05-23 PROCEDURE — 93010 ELECTROCARDIOGRAM REPORT: CPT | Performed by: INTERNAL MEDICINE

## 2020-05-23 NOTE — CARE COORDINATION
Called and left the patient another  with his negative COVID-19 results:  Component Value Ref Range & Units Status Collected Lab   SARS-CoV-2, PCR Not Detected  Not Detected Final 05/22/2020  7:30 PM [facility]

## 2020-05-24 NOTE — CARE COORDINATION
Final outreach; left another  with ACM call back information. Jyoti IZAGUIRRE, RN  Ambulatory Care Manager  [phone]  [email or web address]

## 2020-06-03 ENCOUNTER — HOSPITAL ENCOUNTER (OUTPATIENT)
Age: 20
Discharge: HOME | End: 2020-06-03
Payer: MEDICAID

## 2020-06-03 ENCOUNTER — OFFICE VISIT (OUTPATIENT)
Dept: FAMILY MEDICINE CLINIC | Age: 20
End: 2020-06-03
Payer: MEDICAID

## 2020-06-03 VITALS
HEIGHT: 72 IN | OXYGEN SATURATION: 99 % | TEMPERATURE: 97.8 F | BODY MASS INDEX: 28.33 KG/M2 | SYSTOLIC BLOOD PRESSURE: 120 MMHG | DIASTOLIC BLOOD PRESSURE: 80 MMHG | RESPIRATION RATE: 16 BRPM | WEIGHT: 209.2 LBS | HEART RATE: 80 BPM

## 2020-06-03 DIAGNOSIS — R53.81 CHRONIC MALAISE: ICD-10-CM

## 2020-06-03 LAB
ALBUMIN SERPL-MCNC: 4.8 G/DL (ref 3.4–5)
ALP BLD-CCNC: 101 U/L (ref 40–129)
ALT SERPL-CCNC: 32 U/L (ref 10–40)
AST SERPL-CCNC: 22 U/L (ref 15–37)
BASOPHILS ABSOLUTE: 0 K/UL (ref 0–0.2)
BASOPHILS RELATIVE PERCENT: 0.8 %
BILIRUB SERPL-MCNC: 0.6 MG/DL (ref 0–1)
BILIRUBIN DIRECT: <0.2 MG/DL (ref 0–0.3)
BILIRUBIN, INDIRECT: NORMAL MG/DL (ref 0–1)
C-REACTIVE PROTEIN: 0.5 MG/L (ref 0–5.1)
EOSINOPHILS ABSOLUTE: 0.1 K/UL (ref 0–0.6)
EOSINOPHILS RELATIVE PERCENT: 1.1 %
FERRITIN: 161.8 NG/ML (ref 30–400)
FOLATE: >20 NG/ML (ref 4.78–24.2)
HCT VFR BLD CALC: 44.1 % (ref 40.5–52.5)
HEMOGLOBIN: 14.4 G/DL (ref 13.5–17.5)
IRON SATURATION: 45 % (ref 20–50)
IRON: 133 UG/DL (ref 59–158)
LYMPHOCYTES ABSOLUTE: 2.3 K/UL (ref 1–5.1)
LYMPHOCYTES RELATIVE PERCENT: 43.9 %
MAGNESIUM: 2.1 MG/DL (ref 1.8–2.4)
MCH RBC QN AUTO: 29.7 PG (ref 26–34)
MCHC RBC AUTO-ENTMCNC: 32.6 G/DL (ref 31–36)
MCV RBC AUTO: 91.1 FL (ref 80–100)
MONOCYTES ABSOLUTE: 0.4 K/UL (ref 0–1.3)
MONOCYTES RELATIVE PERCENT: 8.2 %
NEUTROPHILS ABSOLUTE: 2.4 K/UL (ref 1.7–7.7)
NEUTROPHILS RELATIVE PERCENT: 46 %
PDW BLD-RTO: 13.9 % (ref 12.4–15.4)
PHOSPHORUS: 4.2 MG/DL (ref 2.5–4.9)
PLATELET # BLD: 299 K/UL (ref 135–450)
PMV BLD AUTO: 8.5 FL (ref 5–10.5)
RBC # BLD: 4.85 M/UL (ref 4.2–5.9)
SEDIMENTATION RATE, ERYTHROCYTE: 2 MM/HR (ref 0–15)
T4 FREE: 1.3 NG/DL (ref 0.9–1.8)
TOTAL CK: 179 U/L (ref 39–308)
TOTAL IRON BINDING CAPACITY: 293 UG/DL (ref 260–445)
TOTAL PROTEIN: 7.3 G/DL (ref 6.4–8.2)
TSH SERPL DL<=0.05 MIU/L-ACNC: 1.16 UIU/ML (ref 0.27–4.2)
VITAMIN B-12: 722 PG/ML (ref 211–911)
VITAMIN D 25-HYDROXY: 36.8 NG/ML
WBC # BLD: 5.2 K/UL (ref 4–11)

## 2020-06-03 PROCEDURE — 83550 IRON BINDING TEST: CPT

## 2020-06-03 PROCEDURE — 85025 COMPLETE CBC W/AUTO DIFF WBC: CPT

## 2020-06-03 PROCEDURE — 82550 ASSAY OF CK (CPK): CPT

## 2020-06-03 PROCEDURE — 83735 ASSAY OF MAGNESIUM: CPT

## 2020-06-03 PROCEDURE — 80076 HEPATIC FUNCTION PANEL: CPT

## 2020-06-03 PROCEDURE — 82746 ASSAY OF FOLIC ACID SERUM: CPT

## 2020-06-03 PROCEDURE — 36415 COLL VENOUS BLD VENIPUNCTURE: CPT

## 2020-06-03 PROCEDURE — 85652 RBC SED RATE AUTOMATED: CPT

## 2020-06-03 PROCEDURE — 82607 VITAMIN B-12: CPT

## 2020-06-03 PROCEDURE — 82306 VITAMIN D 25 HYDROXY: CPT

## 2020-06-03 PROCEDURE — 86140 C-REACTIVE PROTEIN: CPT

## 2020-06-03 PROCEDURE — 83540 ASSAY OF IRON: CPT

## 2020-06-03 PROCEDURE — 84439 ASSAY OF FREE THYROXINE: CPT

## 2020-06-03 PROCEDURE — 82525 ASSAY OF COPPER: CPT

## 2020-06-03 PROCEDURE — 84443 ASSAY THYROID STIM HORMONE: CPT

## 2020-06-03 PROCEDURE — 82728 ASSAY OF FERRITIN: CPT

## 2020-06-03 PROCEDURE — 84100 ASSAY OF PHOSPHORUS: CPT

## 2020-06-03 PROCEDURE — 99215 OFFICE O/P EST HI 40 MIN: CPT | Performed by: FAMILY MEDICINE

## 2020-06-03 RX ORDER — PRAMIPEXOLE DIHYDROCHLORIDE 0.12 MG/1
0.12 TABLET ORAL NIGHTLY
Qty: 30 TABLET | Refills: 0 | Status: SHIPPED | OUTPATIENT
Start: 2020-06-03

## 2020-06-03 ASSESSMENT — ENCOUNTER SYMPTOMS
TROUBLE SWALLOWING: 0
SINUS PRESSURE: 0
NAUSEA: 0
ABDOMINAL PAIN: 0
COUGH: 0
RHINORRHEA: 0
BACK PAIN: 0
ABDOMINAL DISTENTION: 0
BLOOD IN STOOL: 0
SORE THROAT: 1
DIARRHEA: 0
VOICE CHANGE: 0
WHEEZING: 0
CONSTIPATION: 0
CHEST TIGHTNESS: 1
VOMITING: 0
SHORTNESS OF BREATH: 1

## 2020-06-03 NOTE — PROGRESS NOTES
Andreas Mcgregor   22 y.o. male   2000    HPI:  Chief Complaint   Patient presents with   Radha Mills     wants orders placed,      This patient was last seen by me via virtual visit on 5/20/2020. He was advised by me on 5/27 and 5/28 to schedule a proper face-to-face encounter regarding his complaint of persistent generalized shaking/chills for the past 3 weeks or so. He demanded a thyroid panel to be done as well as MRI of brain and neck. He was tested for COVID-19 on 5/14 and again on 5/22 with various results being negative.  2 days after his last visit with me, he went to [facility] ER with chief complaint of chest tightness, shortness of breath and congestion. On his last visit with me, he stated to me that he was getting better. He had finished a course of azithromycin, which was prescribed by me on his prior visit on 5/13. In addition to COVID-19 testing, his troponin and d-dimer were both negative. CMP was unremarkable. CBC showed very mild anemia of hemoglobin 13.0. Both WBC and platelets were both normal.  He was prescribed albuterol inhaler along with Tessalon Perles and prednisone. Stated that the healthcare provider, who evaluated him recommended a thyroid panel and MRI of the brain and neck, which was not stated in the note. His recent visits with me in May were all conducted over telemedicine. On both visits, I did not notice any signs of shaking, tremors, fasciculations, any signs of neurological deficits of any kind, etc.    Regarding today's visit, he states since last with seen him that he noted of significant progressive bilateral leg weakness that was very apparent yesterday. He stated that he was able to stand up and walk but was very sluggish. He denied stumbling, tripping let alone falling. He did not necessarily feel unbalanced or have any issues with lightheadedness or vertigo. He also denied claudication or numbness or paresthesia.   Regarding this leg weakness, he Encounters:   06/03/20 209 lb 3.2 oz (94.9 kg)   05/22/20 210 lb (95.3 kg)   05/20/20 210 lb (95.3 kg)     BP Readings from Last 3 Encounters:   06/03/20 120/80   05/22/20 135/79   03/29/20 130/73     Pulse Readings from Last 3 Encounters:   06/03/20 80   05/22/20 91   05/14/20 67     /80 (Site: Left Upper Arm, Position: Sitting, Cuff Size: Medium Adult)   Pulse 80   Temp 97.8 °F (36.6 °C)   Resp 16   Ht 6' (1.829 m)   Wt 209 lb 3.2 oz (94.9 kg)   SpO2 99%   BMI 28.37 kg/m²    Physical Exam  Vitals signs reviewed. Constitutional:       General: He is awake. He is not in acute distress. Appearance: Normal appearance. He is well-developed and overweight. He is not ill-appearing, toxic-appearing or diaphoretic. HENT:      Head: Normocephalic and atraumatic. Right Ear: External ear normal.      Left Ear: External ear normal.      Nose: Nose normal.      Mouth/Throat:      Comments: Wearing mask - for COVID-19 reasons. Eyes:      General: Lids are normal. Gaze aligned appropriately. No scleral icterus. Right eye: No foreign body, discharge or hordeolum. Left eye: No foreign body, discharge or hordeolum. Extraocular Movements:      Right eye: Normal extraocular motion. Left eye: Normal extraocular motion. Conjunctiva/sclera: Conjunctivae normal.   Neck:      Musculoskeletal: Full passive range of motion without pain, normal range of motion and neck supple. No erythema. Thyroid: No thyroid mass or thyromegaly. Trachea: Trachea and phonation normal.   Cardiovascular:      Rate and Rhythm: Normal rate and regular rhythm. Pulses:           Dorsalis pedis pulses are 2+ on the right side and 2+ on the left side. Pulmonary:      Effort: Pulmonary effort is normal. No respiratory distress. Breath sounds: Normal breath sounds. No stridor. No wheezing, rhonchi or rales.    Chest:      Chest wall: No mass, lacerations, deformity, swelling, tenderness,

## 2020-06-04 PROBLEM — I88.0 MESENTERIC ADENITIS: Status: ACTIVE | Noted: 2020-06-04

## 2020-06-04 RX ORDER — ACETAMINOPHEN 160 MG
2000 TABLET,DISINTEGRATING ORAL DAILY
COMMUNITY

## 2020-06-07 LAB — COPPER: 93 UG/DL (ref 70–140)

## 2020-06-12 ENCOUNTER — TELEPHONE (OUTPATIENT)
Dept: INTERNAL MEDICINE CLINIC | Age: 20
End: 2020-06-12

## 2020-06-17 PROBLEM — R25.1 TREMOR, UNSPECIFIED: Status: ACTIVE | Noted: 2020-06-17

## 2020-06-17 PROBLEM — R53.1 GENERALIZED WEAKNESS: Status: ACTIVE | Noted: 2020-06-17

## 2020-07-13 RX ORDER — PRAMIPEXOLE DIHYDROCHLORIDE 0.12 MG/1
0.12 TABLET ORAL NIGHTLY
Qty: 30 TABLET | Refills: 0 | OUTPATIENT
Start: 2020-07-13

## 2021-07-26 ENCOUNTER — APPOINTMENT (OUTPATIENT)
Dept: GENERAL RADIOLOGY | Age: 21
End: 2021-07-26
Payer: MEDICAID

## 2021-07-26 ENCOUNTER — HOSPITAL ENCOUNTER (EMERGENCY)
Age: 21
Discharge: HOME | End: 2021-07-26
Attending: EMERGENCY MEDICINE
Payer: MEDICAID

## 2021-07-26 VITALS
BODY MASS INDEX: 28.48 KG/M2 | WEIGHT: 210 LBS | TEMPERATURE: 99 F | SYSTOLIC BLOOD PRESSURE: 123 MMHG | OXYGEN SATURATION: 95 % | HEART RATE: 87 BPM | DIASTOLIC BLOOD PRESSURE: 83 MMHG | RESPIRATION RATE: 19 BRPM

## 2021-07-26 DIAGNOSIS — B34.9 VIRAL ILLNESS: Primary | ICD-10-CM

## 2021-07-26 LAB
A/G RATIO: 1.7 (ref 1.1–2.2)
ALBUMIN SERPL-MCNC: 4.5 G/DL (ref 3.4–5)
ALP BLD-CCNC: 121 U/L (ref 40–129)
ALT SERPL-CCNC: 40 U/L (ref 10–40)
ANION GAP SERPL CALCULATED.3IONS-SCNC: 8 MMOL/L (ref 3–16)
AST SERPL-CCNC: 28 U/L (ref 15–37)
BASOPHILS ABSOLUTE: 0 K/UL (ref 0–0.2)
BASOPHILS RELATIVE PERCENT: 0.5 %
BILIRUB SERPL-MCNC: <0.2 MG/DL (ref 0–1)
BUN BLDV-MCNC: 9 MG/DL (ref 7–20)
CALCIUM SERPL-MCNC: 9.6 MG/DL (ref 8.3–10.6)
CHLORIDE BLD-SCNC: 105 MMOL/L (ref 99–110)
CO2: 27 MMOL/L (ref 21–32)
CREAT SERPL-MCNC: 0.8 MG/DL (ref 0.9–1.3)
EKG ATRIAL RATE: 107 BPM
EKG DIAGNOSIS: NORMAL
EKG P AXIS: 62 DEGREES
EKG P-R INTERVAL: 152 MS
EKG Q-T INTERVAL: 320 MS
EKG QRS DURATION: 88 MS
EKG QTC CALCULATION (BAZETT): 427 MS
EKG R AXIS: 21 DEGREES
EKG T AXIS: 38 DEGREES
EKG VENTRICULAR RATE: 107 BPM
EOSINOPHILS ABSOLUTE: 0.1 K/UL (ref 0–0.6)
EOSINOPHILS RELATIVE PERCENT: 1.3 %
GFR AFRICAN AMERICAN: >60
GFR NON-AFRICAN AMERICAN: >60
GLOBULIN: 2.7 G/DL
GLUCOSE BLD-MCNC: 114 MG/DL (ref 70–99)
HCT VFR BLD CALC: 39.6 % (ref 40.5–52.5)
HEMOGLOBIN: 13.5 G/DL (ref 13.5–17.5)
LACTIC ACID: 1.2 MMOL/L (ref 0.4–2)
LYMPHOCYTES ABSOLUTE: 1.7 K/UL (ref 1–5.1)
LYMPHOCYTES RELATIVE PERCENT: 27.2 %
MCH RBC QN AUTO: 30.3 PG (ref 26–34)
MCHC RBC AUTO-ENTMCNC: 34 G/DL (ref 31–36)
MCV RBC AUTO: 89 FL (ref 80–100)
MONOCYTES ABSOLUTE: 0.6 K/UL (ref 0–1.3)
MONOCYTES RELATIVE PERCENT: 10 %
NEUTROPHILS ABSOLUTE: 3.8 K/UL (ref 1.7–7.7)
NEUTROPHILS RELATIVE PERCENT: 61 %
PDW BLD-RTO: 13.5 % (ref 12.4–15.4)
PLATELET # BLD: 266 K/UL (ref 135–450)
PMV BLD AUTO: 8.1 FL (ref 5–10.5)
POTASSIUM REFLEX MAGNESIUM: 4.2 MMOL/L (ref 3.5–5.1)
RBC # BLD: 4.44 M/UL (ref 4.2–5.9)
SARS-COV-2, NAAT: NOT DETECTED
SODIUM BLD-SCNC: 140 MMOL/L (ref 136–145)
TOTAL PROTEIN: 7.2 G/DL (ref 6.4–8.2)
WBC # BLD: 6.3 K/UL (ref 4–11)

## 2021-07-26 PROCEDURE — 2580000003 HC RX 258: Performed by: EMERGENCY MEDICINE

## 2021-07-26 PROCEDURE — 83605 ASSAY OF LACTIC ACID: CPT

## 2021-07-26 PROCEDURE — 96374 THER/PROPH/DIAG INJ IV PUSH: CPT

## 2021-07-26 PROCEDURE — 87635 SARS-COV-2 COVID-19 AMP PRB: CPT

## 2021-07-26 PROCEDURE — 6360000002 HC RX W HCPCS: Performed by: EMERGENCY MEDICINE

## 2021-07-26 PROCEDURE — 71046 X-RAY EXAM CHEST 2 VIEWS: CPT

## 2021-07-26 PROCEDURE — 36415 COLL VENOUS BLD VENIPUNCTURE: CPT

## 2021-07-26 PROCEDURE — 96375 TX/PRO/DX INJ NEW DRUG ADDON: CPT

## 2021-07-26 PROCEDURE — 93010 ELECTROCARDIOGRAM REPORT: CPT | Performed by: INTERNAL MEDICINE

## 2021-07-26 PROCEDURE — 93005 ELECTROCARDIOGRAM TRACING: CPT | Performed by: EMERGENCY MEDICINE

## 2021-07-26 PROCEDURE — 99283 EMERGENCY DEPT VISIT LOW MDM: CPT

## 2021-07-26 PROCEDURE — 85025 COMPLETE CBC W/AUTO DIFF WBC: CPT

## 2021-07-26 PROCEDURE — 80053 COMPREHEN METABOLIC PANEL: CPT

## 2021-07-26 RX ORDER — ONDANSETRON 4 MG/1
4 TABLET, FILM COATED ORAL EVERY 8 HOURS PRN
Qty: 20 TABLET | Refills: 0 | Status: SHIPPED | OUTPATIENT
Start: 2021-07-26

## 2021-07-26 RX ORDER — ONDANSETRON 2 MG/ML
4 INJECTION INTRAMUSCULAR; INTRAVENOUS ONCE
Status: COMPLETED | OUTPATIENT
Start: 2021-07-26 | End: 2021-07-26

## 2021-07-26 RX ORDER — KETOROLAC TROMETHAMINE 30 MG/ML
15 INJECTION, SOLUTION INTRAMUSCULAR; INTRAVENOUS ONCE
Status: COMPLETED | OUTPATIENT
Start: 2021-07-26 | End: 2021-07-26

## 2021-07-26 RX ORDER — 0.9 % SODIUM CHLORIDE 0.9 %
1000 INTRAVENOUS SOLUTION INTRAVENOUS ONCE
Status: COMPLETED | OUTPATIENT
Start: 2021-07-26 | End: 2021-07-26

## 2021-07-26 RX ADMIN — SODIUM CHLORIDE 1000 ML: 9 INJECTION, SOLUTION INTRAVENOUS at 01:53

## 2021-07-26 RX ADMIN — KETOROLAC TROMETHAMINE 15 MG: 30 INJECTION, SOLUTION INTRAMUSCULAR; INTRAVENOUS at 01:56

## 2021-07-26 RX ADMIN — ONDANSETRON 4 MG: 2 INJECTION INTRAMUSCULAR; INTRAVENOUS at 01:54

## 2021-07-26 ASSESSMENT — PAIN DESCRIPTION - PAIN TYPE: TYPE: ACUTE PAIN

## 2021-07-26 ASSESSMENT — PAIN SCALES - GENERAL
PAINLEVEL_OUTOF10: 10
PAINLEVEL_OUTOF10: 10

## 2021-07-26 ASSESSMENT — PAIN DESCRIPTION - LOCATION: LOCATION: CHEST

## 2021-07-26 ASSESSMENT — ENCOUNTER SYMPTOMS
SHORTNESS OF BREATH: 1
EYES NEGATIVE: 1
ALLERGIC/IMMUNOLOGIC NEGATIVE: 1
GASTROINTESTINAL NEGATIVE: 1

## 2021-07-26 NOTE — ED PROVIDER NOTES
[facility]        Pt Name: Jennifer Jones  MRN: [id number]  [place] 2000  Date of evaluation: 7/26/2021  Provider: Sandra Peña MD  PCP: No primary care provider on file. CHIEF COMPLAINT       Chief Complaint   Patient presents with    Fatigue     and chills x 2 days. Also feeling short of breath. Pt with shakiness at triage and seems to not be able to sit still       HISTORY OF PRESENT ILLNESS   (Location/Symptom, Timing/Onset, Context/Setting, Quality, Duration, Modifying Factors, Severity)  Note limiting factors. Jennifer Jones is a 19 y.o. male who comes in with chills and feels short of breath for the past 3 days. He states there has been no recent travel outside the [place] and no sick contacts. He states that he has nausea but no vomiting. No diarrhea. No exacerbating or relieving factors no other associated signs or symptoms. He has not had Covid in the past year nor is he immunized. No problems with taste or smell    Nursing Notes were all reviewed and agreed with or any disagreements were addressed  in the HPI. REVIEW OF SYSTEMS    (2-9 systems for level 4, 10 or more for level 5)     Review of Systems   Constitutional: Positive for chills and fatigue. HENT: Negative. Eyes: Negative. Respiratory: Positive for shortness of breath. Cardiovascular: Negative. Gastrointestinal: Negative. Endocrine: Negative. Genitourinary: Negative. Musculoskeletal: Negative. Skin: Negative. Allergic/Immunologic: Negative. Neurological: Negative. Hematological: Negative. Psychiatric/Behavioral: Negative.         PAST MEDICAL HISTORY     Past Medical History:   Diagnosis Date    Elevated liver enzymes 2/28/2020       SURGICAL HISTORY     Past Surgical History:   Procedure Laterality Date    WISDOM TOOTH EXTRACTION  01/17/2020       CURRENTMEDICATIONS       Previous Medications ALBUTEROL SULFATE  (90 BASE) MCG/ACT INHALER    Use 2 puffs 4 times daily for 7 days then as needed for wheezing. Dispense with Spacer and instruct in use. At patient's preference may use 60 dose MDI. May Sub Pro-Air or Proventil as needed per insurance. CHOLECALCIFEROL (VITAMIN D3) 50 MCG (2000 UT) CAPS    Take 2,000 Units by mouth daily    CYANOCOBALAMIN (VITAMIN B12 PO)    Take by mouth    GARLIC PO    Take by mouth    IBUPROFEN (ADVIL;MOTRIN) 400 MG TABLET    Take 1 tablet by mouth every 6 hours as needed for Pain    MULTIPLE VITAMINS-MINERALS (VITAMIN D3 COMPLETE PO)    Take by mouth    OMEPRAZOLE (PRILOSEC) 20 MG DELAYED RELEASE CAPSULE    Take 1 capsule by mouth 2 times daily    ONDANSETRON (ZOFRAN) 4 MG TABLET    Take 1 tablet by mouth 3 times daily as needed for Nausea or Vomiting    PRAMIPEXOLE (MIRAPEX) 0.125 MG TABLET    Take 1 tablet by mouth nightly    SUCRALFATE (CARAFATE) 1 GM TABLET    Take 1 tablet by mouth 4 times daily       ALLERGIES     Patient has no known allergies.     FAMILYHISTORY       Family History   Problem Relation Age of Onset    High Cholesterol Mother     High Blood Pressure Mother     Cancer Maternal Grandmother         SOCIAL HISTORY       Social History     Socioeconomic History    Marital status: Single     Spouse name: None    Number of children: None    Years of education: None    Highest education level: None   Occupational History    None   Tobacco Use    Smoking status: Never Smoker    Smokeless tobacco: Never Used   Vaping Use    Vaping Use: Never used   Substance and Sexual Activity    Alcohol use: Never    Drug use: No    Sexual activity: None   Other Topics Concern    None   Social History Narrative    None     Social Determinants of Health     Financial Resource Strain:     Difficulty of Paying Living Expenses:    Food Insecurity:     Worried About Running Out of Food in the Last Year:     Evette of Food in the Last Year: Transportation Needs:     Lack of Transportation (Medical):  Lack of Transportation (Non-Medical):    Physical Activity:     Days of Exercise per Week:     Minutes of Exercise per Session:    Stress:     Feeling of Stress :    Social Connections:     Frequency of Communication with Friends and Family:     Frequency of Social Gatherings with Friends and Family:     Attends Zoroastrian Services:     Active Member of Clubs or Organizations:     Attends Club or Organization Meetings:     Marital Status:    Intimate Partner Violence:     Fear of Current or Ex-Partner:     Emotionally Abused:     Physically Abused:     Sexually Abused:        SCREENINGS             PHYSICAL EXAM    (up to 7 for level 4, 8 or more for level 5)     ED Triage Vitals   BP Temp Temp src Pulse Resp SpO2 Height Weight   07/26/21 0020 07/26/21 0009 -- 07/26/21 0009 07/26/21 0009 07/26/21 0009 -- 07/26/21 0009   (!) 157/88 99 °F (37.2 °C)  112 20 98 %  210 lb (95.3 kg)       Physical Exam  Vitals and nursing note reviewed. Constitutional:       Appearance: Normal appearance. HENT:      Head: Normocephalic and atraumatic. Right Ear: External ear normal.      Left Ear: External ear normal.      Nose: Nose normal.      Mouth/Throat:      Mouth: Mucous membranes are moist.      Pharynx: Oropharynx is clear. Eyes:      Extraocular Movements: Extraocular movements intact. Conjunctiva/sclera: Conjunctivae normal.   Cardiovascular:      Rate and Rhythm: Regular rhythm. Tachycardia present. Pulses: Normal pulses. Heart sounds: Normal heart sounds. Pulmonary:      Effort: Pulmonary effort is normal.      Breath sounds: Normal breath sounds. Abdominal:      General: Bowel sounds are normal.      Tenderness: There is no abdominal tenderness. Musculoskeletal:         General: Normal range of motion. Cervical back: Normal range of motion and neck supple. Skin:     General: Skin is warm and dry. Capillary Refill: Capillary refill takes less than 2 seconds. Neurological:      General: No focal deficit present. Mental Status: He is alert and oriented to person, place, and time. Psychiatric:         Mood and Affect: Mood normal.         Behavior: Behavior normal.         DIAGNOSTIC RESULTS   LABS:    Labs Reviewed   CBC WITH AUTO DIFFERENTIAL - Abnormal; Notable for the following components:       Result Value    Hematocrit 39.6 (*)     All other components within normal limits    Narrative:     Performed at:  [facility] [address], [place]   Phone [phone]   COMPREHENSIVE METABOLIC PANEL W/ REFLEX TO MG FOR LOW K - Abnormal; Notable for the following components:    Glucose 114 (*)     CREATININE 0.8 (*)     All other components within normal limits    Narrative:     Performed at:  [facility] [address], [place]   Phone [phone], RAPID    Narrative:     Performed at:  [facility] [address], [place]   Phone [phone]   LACTIC ACID, PLASMA    Narrative:     Performed at:  [facility] [address], [place]   Phone [phone]       All other labs were within normal range or not returned as of this dictation. EKG:       RADIOLOGY:        Interpretation per the Radiologist below, if available at the time of this note:    XR CHEST (2 VW)   Final Result   Stable chest with no acute abnormality seen. XR CHEST (2 VW)    Result Date: 7/26/2021  EXAMINATION: TWO XRAY VIEWS OF THE CHEST 7/26/2021 12:55 am COMPARISON: 05/22/2020 HISTORY: ORDERING SYSTEM PROVIDED HISTORY: fatigue, chills, and short of breath TECHNOLOGIST PROVIDED HISTORY: Reason for exam:->fatigue, chills, and short of breath FINDINGS: The heart is borderline enlarged but unchanged.   The pulmonary vessels are normal.  No consolidation or effusion is seen. The bones are intact. Stable chest with no acute abnormality seen. PROCEDURES   Unless otherwise noted below, none     Procedures    CRITICAL CARE TIME   N/A    CONSULTS:  None      EMERGENCY DEPARTMENT COURSE and DIFFERENTIAL DIAGNOSIS/MDM:   Vitals:    Vitals:    07/26/21 0009 07/26/21 0020   BP:  (!) 157/88   Pulse: 112    Resp: 20    Temp: 99 °F (37.2 °C)    SpO2: 98%    Weight: 210 lb (95.3 kg)        Patient was given thefollowing medications:  Medications   0.9 % sodium chloride bolus (1,000 mLs Intravenous New Bag 7/26/21 0153)   ondansetron (ZOFRAN) injection 4 mg (4 mg Intravenous Given 7/26/21 0154)   ketorolac (TORADOL) injection 15 mg (15 mg Intravenous Given 7/26/21 0156)           This patient has no sign of serious bacterial illness on physical examination. I gave some fluid and medicine for nausea and he seems to be feeling a little better. Vital signs are stable. Chest x-ray is clear with no infiltrate. Abdomen soft and nontender started on suspect acute appendicitis, cholecystitis, or bowel obstruction. He does not have a leukocytosis and his lactic acid is normal as well. He has no urinary symptoms. His rapid Covid test is negative. I think he can be safely discharged home in good condition follow-up with primary care. Return for persistent vomiting or any worsening symptom. He voiced understanding. FINAL IMPRESSION      1.  Viral illness          DISPOSITION/PLAN   DISPOSITION        PATIENT REFERREDTO:  [facility]) Pre-Services  [phone]          DISCHARGE MEDICATIONS:  New Prescriptions    ONDANSETRON (ZOFRAN) 4 MG TABLET    Take 1 tablet by mouth every 8 hours as needed for Nausea       DISCONTINUED MEDICATIONS:  Discontinued Medications    No medications on file              (Please note that portions ofthis note were completed with a voice recognition program.  Efforts were made to edit the dictations but occasionally words are mis-transcribed.)    Sara Peraza MD (electronically signed)              Sara Peraza MD  07/26/21 6910